# Patient Record
Sex: MALE | Race: WHITE | URBAN - METROPOLITAN AREA
[De-identification: names, ages, dates, MRNs, and addresses within clinical notes are randomized per-mention and may not be internally consistent; named-entity substitution may affect disease eponyms.]

---

## 2017-11-25 ENCOUNTER — NURSE TRIAGE (OUTPATIENT)
Dept: NURSING | Facility: CLINIC | Age: 67
End: 2017-11-25

## 2017-11-25 NOTE — TELEPHONE ENCOUNTER
"  Reason for Disposition    [1] Caller requesting NON-URGENT health information AND [2] PCP's office is the best resource     Wife calling\" Clem has been having left arm and left shoulder pain for a few weeks . [facility]. Had an MRI done on 11/21 in [place] by Dr. Andrews. We haven't gotten the results back yet and he's still having a lot of pain. Should [facility] go [facility] to the [facility]?\" Denies triage. Advised to return to [facility].    Additional Information    Negative: [1] Caller is not with the adult (patient) AND [2] reporting urgent symptoms    Negative: Lab result questions    Negative: Medication questions    Negative: Caller cannot be reached by phone    Negative: Caller has already spoken to PCP or another triager    Negative: RN needs further essential information from caller in order to complete triage    Negative: Requesting regular office appointment    Protocols used: INFORMATION ONLY CALL-ADULT-    "

## 2018-10-09 ENCOUNTER — TRANSFERRED RECORDS (OUTPATIENT)
Dept: HEALTH INFORMATION MANAGEMENT | Facility: CLINIC | Age: 68
End: 2018-10-09

## 2018-11-13 ENCOUNTER — TRANSFERRED RECORDS (OUTPATIENT)
Dept: HEALTH INFORMATION MANAGEMENT | Facility: CLINIC | Age: 68
End: 2018-11-13

## 2018-11-29 ENCOUNTER — TRANSFERRED RECORDS (OUTPATIENT)
Dept: HEALTH INFORMATION MANAGEMENT | Facility: CLINIC | Age: 68
End: 2018-11-29

## 2018-12-27 ENCOUNTER — TRANSFERRED RECORDS (OUTPATIENT)
Dept: HEALTH INFORMATION MANAGEMENT | Facility: CLINIC | Age: 68
End: 2018-12-27

## 2019-03-11 ENCOUNTER — TRANSFERRED RECORDS (OUTPATIENT)
Dept: HEALTH INFORMATION MANAGEMENT | Facility: CLINIC | Age: 69
End: 2019-03-11

## 2019-03-14 ENCOUNTER — TRANSFERRED RECORDS (OUTPATIENT)
Dept: HEALTH INFORMATION MANAGEMENT | Facility: CLINIC | Age: 69
End: 2019-03-14

## 2019-03-14 LAB
ALT SERPL-CCNC: 38 U/L (ref 13–61)
AST SERPL-CCNC: 18 U/L (ref 15–37)
CREAT SERPL-MCNC: 2.9 MG/DL (ref 0.7–1.2)
GFR SERPL CREATININE-BSD FRML MDRD: 22 ML/MIN/1.73M2
GLUCOSE SERPL-MCNC: 175 MG/DL (ref 70–100)
INR PPP: 1.17
POTASSIUM SERPL-SCNC: 3.8 MMOL/L (ref 3.5–5)

## 2019-03-15 ENCOUNTER — APPOINTMENT (OUTPATIENT)
Dept: SPEECH THERAPY | Facility: CLINIC | Age: 69
DRG: 683 | End: 2019-03-15
Attending: INTERNAL MEDICINE
Payer: COMMERCIAL

## 2019-03-15 ENCOUNTER — APPOINTMENT (OUTPATIENT)
Dept: ULTRASOUND IMAGING | Facility: CLINIC | Age: 69
DRG: 683 | End: 2019-03-15
Attending: PODIATRIST
Payer: COMMERCIAL

## 2019-03-15 ENCOUNTER — HOSPITAL ENCOUNTER (INPATIENT)
Facility: CLINIC | Age: 69
LOS: 3 days | Discharge: HOME | DRG: 683 | End: 2019-03-18
Attending: INTERNAL MEDICINE | Admitting: INTERNAL MEDICINE
Payer: COMMERCIAL

## 2019-03-15 DIAGNOSIS — R07.0 THROAT PAIN: Primary | ICD-10-CM

## 2019-03-15 PROBLEM — N17.9 ACUTE KIDNEY INJURY (H): Status: ACTIVE | Noted: 2019-03-15

## 2019-03-15 LAB
ALBUMIN SERPL-MCNC: 3.2 G/DL (ref 3.4–5)
ALP SERPL-CCNC: 79 U/L (ref 40–150)
ALT SERPL W P-5'-P-CCNC: 29 U/L (ref 0–70)
ANION GAP SERPL CALCULATED.3IONS-SCNC: 8 MMOL/L (ref 3–14)
ANION GAP SERPL CALCULATED.3IONS-SCNC: 9 MMOL/L (ref 3–14)
AST SERPL W P-5'-P-CCNC: 18 U/L (ref 0–45)
BILIRUB DIRECT SERPL-MCNC: 0.1 MG/DL (ref 0–0.2)
BILIRUB SERPL-MCNC: 0.3 MG/DL (ref 0.2–1.3)
BUN SERPL-MCNC: 45 MG/DL (ref 7–30)
BUN SERPL-MCNC: 48 MG/DL (ref 7–30)
C COLI+JEJUNI+LARI FUSA STL QL NAA+PROBE: NOT DETECTED
C DIFF TOX B STL QL: NEGATIVE
CALCIUM SERPL-MCNC: 8.5 MG/DL (ref 8.5–10.1)
CALCIUM SERPL-MCNC: 8.6 MG/DL (ref 8.5–10.1)
CHLORIDE SERPL-SCNC: 106 MMOL/L (ref 94–109)
CHLORIDE SERPL-SCNC: 109 MMOL/L (ref 94–109)
CO2 SERPL-SCNC: 26 MMOL/L (ref 20–32)
CO2 SERPL-SCNC: 27 MMOL/L (ref 20–32)
CREAT SERPL-MCNC: 2.46 MG/DL (ref 0.66–1.25)
CREAT SERPL-MCNC: 2.71 MG/DL (ref 0.66–1.25)
EC STX1 GENE STL QL NAA+PROBE: NOT DETECTED
EC STX2 GENE STL QL NAA+PROBE: NOT DETECTED
ENTERIC PATHOGEN COMMENT: NORMAL
ERYTHROCYTE [DISTWIDTH] IN BLOOD BY AUTOMATED COUNT: 14.3 % (ref 10–15)
GFR SERPL CREATININE-BSD FRML MDRD: 23 ML/MIN/{1.73_M2}
GFR SERPL CREATININE-BSD FRML MDRD: 26 ML/MIN/{1.73_M2}
GLUCOSE BLDC GLUCOMTR-MCNC: 135 MG/DL (ref 70–99)
GLUCOSE BLDC GLUCOMTR-MCNC: 140 MG/DL (ref 70–99)
GLUCOSE BLDC GLUCOMTR-MCNC: 141 MG/DL (ref 70–99)
GLUCOSE BLDC GLUCOMTR-MCNC: 164 MG/DL (ref 70–99)
GLUCOSE BLDC GLUCOMTR-MCNC: 259 MG/DL (ref 70–99)
GLUCOSE SERPL-MCNC: 139 MG/DL (ref 70–99)
GLUCOSE SERPL-MCNC: 187 MG/DL (ref 70–99)
HBA1C MFR BLD: 7.8 % (ref 0–5.6)
HCT VFR BLD AUTO: 32.6 % (ref 40–53)
HEMOCCULT STL QL: NEGATIVE
HGB BLD-MCNC: 10.8 G/DL (ref 13.3–17.7)
MCH RBC QN AUTO: 29.6 PG (ref 26.5–33)
MCHC RBC AUTO-ENTMCNC: 33.1 G/DL (ref 31.5–36.5)
MCV RBC AUTO: 89 FL (ref 78–100)
NOROV GI+II ORF1-ORF2 JNC STL QL NAA+PR: NOT DETECTED
PLATELET # BLD AUTO: 241 10E9/L (ref 150–450)
POTASSIUM SERPL-SCNC: 4.6 MMOL/L (ref 3.4–5.3)
POTASSIUM SERPL-SCNC: 4.8 MMOL/L (ref 3.4–5.3)
PROT SERPL-MCNC: 7.1 G/DL (ref 6.8–8.8)
RBC # BLD AUTO: 3.65 10E12/L (ref 4.4–5.9)
RVA NSP5 STL QL NAA+PROBE: NOT DETECTED
SALMONELLA SP RPOD STL QL NAA+PROBE: NOT DETECTED
SHIGELLA SP+EIEC IPAH STL QL NAA+PROBE: NOT DETECTED
SODIUM SERPL-SCNC: 142 MMOL/L (ref 133–144)
SODIUM SERPL-SCNC: 143 MMOL/L (ref 133–144)
SPECIMEN SOURCE: NORMAL
V CHOL+PARA RFBL+TRKH+TNAA STL QL NAA+PR: NOT DETECTED
WBC # BLD AUTO: 5.7 10E9/L (ref 4–11)
Y ENTERO RECN STL QL NAA+PROBE: NOT DETECTED

## 2019-03-15 PROCEDURE — 25000128 H RX IP 250 OP 636

## 2019-03-15 PROCEDURE — 00000146 ZZHCL STATISTIC GLUCOSE BY METER IP

## 2019-03-15 PROCEDURE — 25000132 ZZH RX MED GY IP 250 OP 250 PS 637: Performed by: INTERNAL MEDICINE

## 2019-03-15 PROCEDURE — 87493 C DIFF AMPLIFIED PROBE: CPT | Performed by: INTERNAL MEDICINE

## 2019-03-15 PROCEDURE — 93922 UPR/L XTREMITY ART 2 LEVELS: CPT

## 2019-03-15 PROCEDURE — 25000132 ZZH RX MED GY IP 250 OP 250 PS 637: Performed by: HOSPITALIST

## 2019-03-15 PROCEDURE — 36415 COLL VENOUS BLD VENIPUNCTURE: CPT | Performed by: HOSPITALIST

## 2019-03-15 PROCEDURE — 25800030 ZZH RX IP 258 OP 636: Performed by: INTERNAL MEDICINE

## 2019-03-15 PROCEDURE — 92610 EVALUATE SWALLOWING FUNCTION: CPT | Mod: GN | Performed by: SPEECH-LANGUAGE PATHOLOGIST

## 2019-03-15 PROCEDURE — 87506 IADNA-DNA/RNA PROBE TQ 6-11: CPT | Performed by: INTERNAL MEDICINE

## 2019-03-15 PROCEDURE — 12000000 ZZH R&B MED SURG/OB

## 2019-03-15 PROCEDURE — 25000128 H RX IP 250 OP 636: Performed by: INTERNAL MEDICINE

## 2019-03-15 PROCEDURE — 85027 COMPLETE CBC AUTOMATED: CPT | Performed by: INTERNAL MEDICINE

## 2019-03-15 PROCEDURE — 36415 COLL VENOUS BLD VENIPUNCTURE: CPT | Performed by: INTERNAL MEDICINE

## 2019-03-15 PROCEDURE — 99223 1ST HOSP IP/OBS HIGH 75: CPT | Mod: AI | Performed by: INTERNAL MEDICINE

## 2019-03-15 PROCEDURE — 99221 1ST HOSP IP/OBS SF/LOW 40: CPT | Performed by: PODIATRIST

## 2019-03-15 PROCEDURE — 25000125 ZZHC RX 250: Performed by: HOSPITALIST

## 2019-03-15 PROCEDURE — 82272 OCCULT BLD FECES 1-3 TESTS: CPT | Performed by: INTERNAL MEDICINE

## 2019-03-15 PROCEDURE — 80048 BASIC METABOLIC PNL TOTAL CA: CPT | Performed by: HOSPITALIST

## 2019-03-15 PROCEDURE — 25800030 ZZH RX IP 258 OP 636: Performed by: HOSPITALIST

## 2019-03-15 PROCEDURE — 83036 HEMOGLOBIN GLYCOSYLATED A1C: CPT | Performed by: INTERNAL MEDICINE

## 2019-03-15 PROCEDURE — 92526 ORAL FUNCTION THERAPY: CPT | Mod: GN | Performed by: SPEECH-LANGUAGE PATHOLOGIST

## 2019-03-15 PROCEDURE — 80076 HEPATIC FUNCTION PANEL: CPT | Performed by: INTERNAL MEDICINE

## 2019-03-15 PROCEDURE — 80048 BASIC METABOLIC PNL TOTAL CA: CPT | Performed by: INTERNAL MEDICINE

## 2019-03-15 RX ORDER — MULTIPLE VITAMINS W/ MINERALS TAB 9MG-400MCG
1 TAB ORAL DAILY
COMMUNITY

## 2019-03-15 RX ORDER — LIRAGLUTIDE 6 MG/ML
0.6 INJECTION SUBCUTANEOUS DAILY
COMMUNITY

## 2019-03-15 RX ORDER — ATORVASTATIN CALCIUM 40 MG/1
40 TABLET, FILM COATED ORAL AT BEDTIME
Status: DISCONTINUED | OUTPATIENT
Start: 2019-03-15 | End: 2019-03-18 | Stop reason: HOSPADM

## 2019-03-15 RX ORDER — HYDROMORPHONE HYDROCHLORIDE 1 MG/ML
.3-.5 INJECTION, SOLUTION INTRAMUSCULAR; INTRAVENOUS; SUBCUTANEOUS
Status: DISCONTINUED | OUTPATIENT
Start: 2019-03-15 | End: 2019-03-16

## 2019-03-15 RX ORDER — MAGNESIUM OXIDE 420 MG/1
840 TABLET ORAL 2 TIMES DAILY
COMMUNITY

## 2019-03-15 RX ORDER — ONDANSETRON 4 MG/1
4 TABLET, ORALLY DISINTEGRATING ORAL EVERY 6 HOURS PRN
Status: DISCONTINUED | OUTPATIENT
Start: 2019-03-15 | End: 2019-03-18 | Stop reason: HOSPADM

## 2019-03-15 RX ORDER — GABAPENTIN 300 MG/1
600 CAPSULE ORAL 2 TIMES DAILY
Status: DISCONTINUED | OUTPATIENT
Start: 2019-03-15 | End: 2019-03-18 | Stop reason: HOSPADM

## 2019-03-15 RX ORDER — CLOPIDOGREL BISULFATE 75 MG/1
75 TABLET ORAL DAILY
COMMUNITY

## 2019-03-15 RX ORDER — ACETAMINOPHEN 325 MG/1
325-650 TABLET ORAL EVERY 4 HOURS PRN
COMMUNITY

## 2019-03-15 RX ORDER — CARBOXYMETHYLCELLULOSE SODIUM 5 MG/ML
1 SOLUTION/ DROPS OPHTHALMIC 2 TIMES DAILY PRN
Status: DISCONTINUED | OUTPATIENT
Start: 2019-03-15 | End: 2019-03-15

## 2019-03-15 RX ORDER — METOPROLOL TARTRATE 50 MG
50 TABLET ORAL 2 TIMES DAILY
Status: ON HOLD | COMMUNITY
End: 2019-03-18

## 2019-03-15 RX ORDER — SODIUM CHLORIDE 9 MG/ML
INJECTION, SOLUTION INTRAVENOUS CONTINUOUS
Status: DISCONTINUED | OUTPATIENT
Start: 2019-03-15 | End: 2019-03-15

## 2019-03-15 RX ORDER — ASPIRIN 81 MG/1
81 TABLET, CHEWABLE ORAL DAILY
COMMUNITY

## 2019-03-15 RX ORDER — DEXTROSE MONOHYDRATE 25 G/50ML
25-50 INJECTION, SOLUTION INTRAVENOUS
Status: DISCONTINUED | OUTPATIENT
Start: 2019-03-15 | End: 2019-03-18 | Stop reason: HOSPADM

## 2019-03-15 RX ORDER — NALOXONE HYDROCHLORIDE 0.4 MG/ML
.1-.4 INJECTION, SOLUTION INTRAMUSCULAR; INTRAVENOUS; SUBCUTANEOUS
Status: DISCONTINUED | OUTPATIENT
Start: 2019-03-15 | End: 2019-03-15

## 2019-03-15 RX ORDER — HYDROMORPHONE HYDROCHLORIDE 2 MG/1
2-4 TABLET ORAL EVERY 4 HOURS PRN
Status: ON HOLD | COMMUNITY
End: 2019-03-18

## 2019-03-15 RX ORDER — ATORVASTATIN CALCIUM 80 MG/1
40 TABLET, FILM COATED ORAL AT BEDTIME
COMMUNITY

## 2019-03-15 RX ORDER — CLOPIDOGREL BISULFATE 75 MG/1
75 TABLET ORAL DAILY
Status: DISCONTINUED | OUTPATIENT
Start: 2019-03-15 | End: 2019-03-18 | Stop reason: HOSPADM

## 2019-03-15 RX ORDER — MINERAL OIL/HYDROPHIL PETROLAT
OINTMENT (GRAM) TOPICAL 2 TIMES DAILY
COMMUNITY

## 2019-03-15 RX ORDER — SERTRALINE HCL 25 MG
12.5 TABLET ORAL DAILY
Status: DISCONTINUED | OUTPATIENT
Start: 2019-03-16 | End: 2019-03-15

## 2019-03-15 RX ORDER — ONDANSETRON 2 MG/ML
4 INJECTION INTRAMUSCULAR; INTRAVENOUS EVERY 6 HOURS PRN
Status: DISCONTINUED | OUTPATIENT
Start: 2019-03-15 | End: 2019-03-15

## 2019-03-15 RX ORDER — LORAZEPAM 0.5 MG/1
0.5 TABLET ORAL
COMMUNITY

## 2019-03-15 RX ORDER — TRIAMCINOLONE ACETONIDE 0.25 MG/G
CREAM TOPICAL 2 TIMES DAILY
COMMUNITY

## 2019-03-15 RX ORDER — LORAZEPAM 0.5 MG/1
0.5 TABLET ORAL
Status: DISCONTINUED | OUTPATIENT
Start: 2019-03-15 | End: 2019-03-18 | Stop reason: HOSPADM

## 2019-03-15 RX ORDER — PROCHLORPERAZINE 25 MG
12.5 SUPPOSITORY, RECTAL RECTAL EVERY 12 HOURS PRN
Status: DISCONTINUED | OUTPATIENT
Start: 2019-03-15 | End: 2019-03-18 | Stop reason: HOSPADM

## 2019-03-15 RX ORDER — GABAPENTIN 300 MG/1
600 CAPSULE ORAL 2 TIMES DAILY
COMMUNITY

## 2019-03-15 RX ORDER — ASPIRIN 81 MG/1
81 TABLET, CHEWABLE ORAL DAILY
Status: DISCONTINUED | OUTPATIENT
Start: 2019-03-15 | End: 2019-03-18 | Stop reason: HOSPADM

## 2019-03-15 RX ORDER — NALOXONE HYDROCHLORIDE 0.4 MG/ML
.1-.4 INJECTION, SOLUTION INTRAMUSCULAR; INTRAVENOUS; SUBCUTANEOUS
Status: DISCONTINUED | OUTPATIENT
Start: 2019-03-15 | End: 2019-03-18 | Stop reason: HOSPADM

## 2019-03-15 RX ORDER — HYDROMORPHONE HYDROCHLORIDE 2 MG/1
2-4 TABLET ORAL
Status: DISCONTINUED | OUTPATIENT
Start: 2019-03-15 | End: 2019-03-16

## 2019-03-15 RX ORDER — METOPROLOL TARTRATE 50 MG
50 TABLET ORAL 2 TIMES DAILY
Status: DISCONTINUED | OUTPATIENT
Start: 2019-03-15 | End: 2019-03-17

## 2019-03-15 RX ORDER — ONDANSETRON 2 MG/ML
INJECTION INTRAMUSCULAR; INTRAVENOUS
Status: COMPLETED
Start: 2019-03-15 | End: 2019-03-15

## 2019-03-15 RX ORDER — DOCUSATE SODIUM 100 MG/1
100 CAPSULE, LIQUID FILLED ORAL DAILY
COMMUNITY

## 2019-03-15 RX ORDER — PROCHLORPERAZINE MALEATE 5 MG
5 TABLET ORAL EVERY 6 HOURS PRN
Status: DISCONTINUED | OUTPATIENT
Start: 2019-03-15 | End: 2019-03-18 | Stop reason: HOSPADM

## 2019-03-15 RX ORDER — ONDANSETRON 4 MG/1
4 TABLET, ORALLY DISINTEGRATING ORAL EVERY 6 HOURS PRN
Status: DISCONTINUED | OUTPATIENT
Start: 2019-03-15 | End: 2019-03-15

## 2019-03-15 RX ORDER — SERTRALINE HYDROCHLORIDE 25 MG/1
12.5 TABLET, FILM COATED ORAL DAILY
Status: ON HOLD | COMMUNITY
End: 2019-03-15

## 2019-03-15 RX ORDER — LOPERAMIDE HCL 2 MG
2 CAPSULE ORAL 4 TIMES DAILY PRN
COMMUNITY

## 2019-03-15 RX ORDER — ONDANSETRON 2 MG/ML
4 INJECTION INTRAMUSCULAR; INTRAVENOUS EVERY 6 HOURS PRN
Status: DISCONTINUED | OUTPATIENT
Start: 2019-03-15 | End: 2019-03-18 | Stop reason: HOSPADM

## 2019-03-15 RX ORDER — SUCRALFATE ORAL 1 G/10ML
10 SUSPENSION ORAL 4 TIMES DAILY PRN
Status: ON HOLD | COMMUNITY
End: 2019-03-18

## 2019-03-15 RX ORDER — SUCRALFATE ORAL 1 G/10ML
1 SUSPENSION ORAL 4 TIMES DAILY PRN
Status: DISCONTINUED | OUTPATIENT
Start: 2019-03-15 | End: 2019-03-18 | Stop reason: HOSPADM

## 2019-03-15 RX ORDER — FLUOCINONIDE 0.5 MG/G
CREAM TOPICAL 2 TIMES DAILY
COMMUNITY

## 2019-03-15 RX ORDER — NICOTINE POLACRILEX 4 MG
15-30 LOZENGE BUCCAL
Status: DISCONTINUED | OUTPATIENT
Start: 2019-03-15 | End: 2019-03-18 | Stop reason: HOSPADM

## 2019-03-15 RX ORDER — SODIUM CHLORIDE, SODIUM LACTATE, POTASSIUM CHLORIDE, CALCIUM CHLORIDE 600; 310; 30; 20 MG/100ML; MG/100ML; MG/100ML; MG/100ML
INJECTION, SOLUTION INTRAVENOUS CONTINUOUS
Status: DISCONTINUED | OUTPATIENT
Start: 2019-03-15 | End: 2019-03-16

## 2019-03-15 RX ORDER — DIPHENHYDRAMINE HYDROCHLORIDE AND LIDOCAINE HYDROCHLORIDE AND ALUMINUM HYDROXIDE AND MAGNESIUM HYDRO
10 KIT EVERY 6 HOURS PRN
Status: DISCONTINUED | OUTPATIENT
Start: 2019-03-15 | End: 2019-03-18 | Stop reason: HOSPADM

## 2019-03-15 RX ORDER — LIDOCAINE 50 MG/G
1 PATCH TOPICAL AT BEDTIME
COMMUNITY

## 2019-03-15 RX ADMIN — ONDANSETRON 4 MG: 2 INJECTION INTRAMUSCULAR; INTRAVENOUS at 02:15

## 2019-03-15 RX ADMIN — DIPHENHYDRAMINE HYDROCHLORIDE AND LIDOCAINE HYDROCHLORIDE AND ALUMINUM HYDROXIDE AND MAGNESIUM HYDRO 10 ML: KIT at 18:11

## 2019-03-15 RX ADMIN — CLOPIDOGREL BISULFATE 75 MG: 75 TABLET, FILM COATED ORAL at 18:12

## 2019-03-15 RX ADMIN — HYDROMORPHONE HYDROCHLORIDE 0.5 MG: 1 INJECTION, SOLUTION INTRAMUSCULAR; INTRAVENOUS; SUBCUTANEOUS at 08:42

## 2019-03-15 RX ADMIN — HYDROMORPHONE HYDROCHLORIDE 0.5 MG: 1 INJECTION, SOLUTION INTRAMUSCULAR; INTRAVENOUS; SUBCUTANEOUS at 18:11

## 2019-03-15 RX ADMIN — GABAPENTIN 600 MG: 300 CAPSULE ORAL at 21:03

## 2019-03-15 RX ADMIN — DIPHENHYDRAMINE HYDROCHLORIDE AND LIDOCAINE HYDROCHLORIDE AND ALUMINUM HYDROXIDE AND MAGNESIUM HYDRO 10 ML: KIT at 14:19

## 2019-03-15 RX ADMIN — LIDOCAINE HYDROCHLORIDE 15 ML: 20 SOLUTION ORAL; TOPICAL at 18:11

## 2019-03-15 RX ADMIN — SODIUM CHLORIDE, POTASSIUM CHLORIDE, SODIUM LACTATE AND CALCIUM CHLORIDE: 600; 310; 30; 20 INJECTION, SOLUTION INTRAVENOUS at 18:26

## 2019-03-15 RX ADMIN — ASPIRIN 81 MG 81 MG: 81 TABLET ORAL at 18:12

## 2019-03-15 RX ADMIN — SODIUM CHLORIDE: 9 INJECTION, SOLUTION INTRAVENOUS at 09:35

## 2019-03-15 RX ADMIN — HYDROMORPHONE HYDROCHLORIDE 0.5 MG: 1 INJECTION, SOLUTION INTRAMUSCULAR; INTRAVENOUS; SUBCUTANEOUS at 02:39

## 2019-03-15 RX ADMIN — SODIUM CHLORIDE: 9 INJECTION, SOLUTION INTRAVENOUS at 03:13

## 2019-03-15 RX ADMIN — SODIUM CHLORIDE: 9 INJECTION, SOLUTION INTRAVENOUS at 17:00

## 2019-03-15 RX ADMIN — ATORVASTATIN CALCIUM 40 MG: 40 TABLET, FILM COATED ORAL at 21:01

## 2019-03-15 RX ADMIN — HYDROMORPHONE HYDROCHLORIDE 0.5 MG: 1 INJECTION, SOLUTION INTRAMUSCULAR; INTRAVENOUS; SUBCUTANEOUS at 23:01

## 2019-03-15 RX ADMIN — LIDOCAINE HYDROCHLORIDE 5 ML: 20 SOLUTION ORAL; TOPICAL at 14:18

## 2019-03-15 RX ADMIN — HYDROMORPHONE HYDROCHLORIDE 0.5 MG: 1 INJECTION, SOLUTION INTRAMUSCULAR; INTRAVENOUS; SUBCUTANEOUS at 13:14

## 2019-03-15 RX ADMIN — METOPROLOL TARTRATE 50 MG: 50 TABLET ORAL at 21:00

## 2019-03-15 RX ADMIN — HYDROMORPHONE HYDROCHLORIDE 0.5 MG: 1 INJECTION, SOLUTION INTRAMUSCULAR; INTRAVENOUS; SUBCUTANEOUS at 20:41

## 2019-03-15 RX ADMIN — HYDROMORPHONE HYDROCHLORIDE 0.5 MG: 1 INJECTION, SOLUTION INTRAMUSCULAR; INTRAVENOUS; SUBCUTANEOUS at 15:36

## 2019-03-15 RX ADMIN — HYDROMORPHONE HYDROCHLORIDE 0.5 MG: 1 INJECTION, SOLUTION INTRAMUSCULAR; INTRAVENOUS; SUBCUTANEOUS at 11:08

## 2019-03-15 ASSESSMENT — MIFFLIN-ST. JEOR: SCORE: 1608.61

## 2019-03-15 ASSESSMENT — ACTIVITIES OF DAILY LIVING (ADL)
ADLS_ACUITY_SCORE: 24

## 2019-03-15 NOTE — PROGRESS NOTES
[facility]    Hospitalist Progress Note      Assessment & Plan   Clem Rae is a 68 year old male who was admitted on 3/15/2019 for throat pain and poor PO intake secondary to mucositis/esophagitis from radiation treatment.    Acute kidney injury on chronic kidney disease--improving   Baseline creatinine near 2 and is currently up to 2.9.  Due to poor PO intake with inability to swallow from pain from his tongue malignancy.  Tolerating small amounts PO. Continue IVF @125ml/hr  - Repeat BMP in AM    Diarrhea  Darker stools, with diarrhea. No elevation in WBC, afebrile, no abd pain. Cdiff and enteric panel all negative. Hemoccult negative.  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 Has received chemo and radiation therapy in the past. Essentially has been NPO for days due to pain. ENT evaluated, no new developments, recommended to continue aggressive pain management and hydration  - Continue IV dilaudid intermixed with PO dilaudid (his home regimen for pain)  - Magic mouthwash and lidocaine 15ml q2h to help with pain  - PRN carafate  - Next oncology appt 3/25 ([facility] at [facility]). Consider oncology consult if no improvement with aggressive pain control    Diabetes  Previously on insulin, now on Victoza.   - Continue sliding scale insulin (medium resistance) while with unpredictable PO intake  - moderate consistent carb diet    Coronary artery disease, carotid artery stenosis and previous stroke  PTA Statin, ASA and plavix. Hemoccult negative  - Resume dual antiplatelet and statin today    Hypertension  PTA metoprolol, resumed    Left great toe ingrown toenail  -Await podiatry consult, does not appear infected    DVT Prophylaxis: PCDs  Code Status: Full Code  Expected discharge: 48 hours recommended to prior living arrangement once able to tolerate diet on PO pain medications and diarrhea  improved.    Alba Chacon, DO  Text Page (7am - 6pm)    Interval History   Patient seen and examined. Voice is clear, he states he is hungry and that the IV dilaudid has been great at relieving his pain. Requested 15ml Lidocaine before meals to help him tolerate diet better. Denies shortness of breath, chest pain, abdominal pain, nausea, vomiting.    -Data reviewed today: I reviewed all new labs and imaging results over the last 24 hours. I personally reviewed no images or EKG's today.    Physical Exam   Temp: 97.6  F (36.4  C) Temp src: Oral BP: 123/60 Pulse: 73   Resp: 16 SpO2: 97 % O2 Device: None (Room air)    There were no vitals filed for this visit.  Vital Signs with Ranges  Temp:  [97.4  F (36.3  C)-97.6  F (36.4  C)] 97.6  F (36.4  C)  Pulse:  [68-90] 73  Resp:  [16] 16  BP: (123-134)/(57-76) 123/60  SpO2:  [95 %-97 %] 97 %  I/O last 3 completed shifts:  In: 0   Out: 350 [Urine:350]    Constitutional: Awake, alert, cooperative, no apparent distress  Respiratory: Clear to auscultation bilaterally, no crackles or wheezing  Cardiovascular: Regular rate and rhythm, normal S1 and S2, and no murmur noted  GI: Normal bowel sounds, soft, non-distended, non-tender  Skin/Integumen: No rashes, no cyanosis, no edema  Other: Left great toe with slight erythema on medial edge, nontender to touch    Medications     sodium chloride 125 mL/hr at 03/15/19 0935       insulin aspart  1-7 Units Subcutaneous TID AC     insulin aspart  1-5 Units Subcutaneous At Bedtime       Data   Recent Labs   Lab 03/15/19  0759 03/15/19  0757   WBC  --  5.7   HGB  --  10.8*   MCV  --  89   PLT  --  241     --    POTASSIUM 4.8  --    CHLORIDE 106  --    CO2 27  --    BUN 48*  --    CR 2.71*  --    ANIONGAP 9  --    JORGE 8.6  --    *  --    ALBUMIN 3.2*  --    PROTTOTAL 7.1  --    BILITOTAL 0.3  --    ALKPHOS 79  --    ALT 29  --    AST 18  --        No results found for this or any previous visit (from the past 24 hour(s)).

## 2019-03-15 NOTE — PROGRESS NOTES
"SPIRITUAL HEALTH SERVICES Progress Note  [facility]    Visit with pt, per request in chart.  Pt's daughter (Matilde?) was also present.    Pt shared briefly the story of lengthy hospital stay and current health challenges.  He said, \"Prayer is always good medicine for me.  It really lifts my spirits.\"  Provided empathic support and prayer.    [facility]  I explained  availability here at [facility], per his need or request.                                                                                                                                                 Estefany Bocanegra M.A.  Staff   Pager [phone]  Phone [phone]      "

## 2019-03-15 NOTE — PLAN OF CARE
Discharge Planner SLP   Patient plan for discharge: Home with family  Current status: SLP: Reviewed SLP role with Pt. Pt is [facility] and reported performing exercises and relaxation strategies with no swallowing difficulty when pt is well managed. Pt denied PO trials at this time and reported pain with swallowing secretions and with talking. Pt requested plan to be better pain control and timing of meds with meals to eat as much as possible while pain is managed. Pt is agreeable to nutritional supplements if he is unable to eat enough to maintain weight and would like to avoid feeding tube. Pt is agreeable for SLP to return at a meal when he is able to eat. Discussed plan with RN and Dietician. Continue regular diet, thin liquids with self-selection of softer/more moist foods and use of swallow strategies.   Barriers to return to prior living situation: Medical status  Recommendations for discharge: Home with OP SLP services for continued close monitoring pending oncology plan  Rationale for recommendations: Pt will need ongoing SLP with specialty in head and neck cancer       Entered by: Devi Garibay 03/15/2019 3:20 PM

## 2019-03-15 NOTE — H&P
Admitted:     03/15/2019      PRIMARY CARE PHYSICIAN:  [facility].      CODE STATUS:  FULL CODE.  Discussed with the patient.      CHIEF COMPLAINT:  Throat pain and poor p.o. intake.      HISTORY OF PRESENT ILLNESS:  Mr. 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 intake for many days.  The patient was transferred to [facility] due to a lack of beds at the [facility].  The patient notes that he has a history of tongue cancer with metastasis to the lymph nodes, for which he has undergone radiation therapy and chemotherapy.  The patient notes that he has essentially been a long-term resident of the [facility] from 12/26/2018 through 03/01/2019, leaving the hospital approximately 2 weeks ago.  The patient states that during that time he received chemotherapy as well as radiation therapy and did leave the hospital on a couple of weekends but otherwise was living at the [facility].  When he left on 03/01, he had pain in his throat, but this was mostly able to be managed with the p.o. Dilaudid.  The patient notes that he has used Magic Mouthwash in the past and at least at that point has not had substantial improvement.  He, over the past couple of days, has noted increasing pain in his throat, especially when swallowing, to the point where he has had very little fluids and almost no food because of the pain.  He has not had any fevers or chills.  No sick contacts.  The pain is in the same location it always has been and has been increasing in intensity but otherwise is not a new discomfort.  He has not noted any spots such as thrush in his mouth.  He denies any chest  discomfort, shortness of breath or abdominal pain.  He has had a cough at times, which is nonproductive and makes his pain worse.      The patient also notes that he has had diarrhea now for the past 10 days, shortly after he left the hospital.  He has noted some darkness to the stool, including black at times, and today had what he described as coffee-ground stool.  He has been getting increasingly weak.  He also notes that his wife today thought that his left toe looked irritated.  The patient notes that he has some discomfort in the toe if it touches anything but otherwise is okay at rest.      In the Emergency Department at the [facility], the patient had basic labs showing an elevated creatinine to 2.9, up from a baseline of what appears to be roughly 2 in digging back through his records from the [facility].  Hemoglobin was 12.2, and white count was normal at 6.  Lactic acid was 1.9.  Potassium is 3.8.  He was given IV fluids and, because of a lack of beds at the [facility], transferred to [facility].      Of note, the patient states he has lost 40 pounds since he began his initial hospitalization right after Christmas of 2018.  It does not seem like his weight loss has accelerated.  He is no longer using insulin but does use Victoza for his diabetes.  He does not tell me of any recent changes to his medications.      PAST MEDICAL HISTORY:   1.  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   2.  Chronic pain related to the tongue cancer.   3.  Anxiety, depression and PTSD.   4.  Coronary artery disease, status post 4-vessel CABG in 2012.   5.  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   6.  Carotid artery stenosis.   7.  Chronic kidney disease, stage 3, with what appears to be a baseline creatinine of roughly 2.0 in the past year.   8.  Idiopathic peripheral  neuropathy.   9.  Dyslipidemia.   10.  Erectile dysfunction.   11.  Osteoarthritis.   12.  Stroke.   13.  Anemia.   14.  Vitamin D deficiency.   15.  Hypertension.      MEDICATIONS:    Medications Prior to Admission   Medication Sig Dispense Refill Last Dose     acetaminophen (TYLENOL) 325 MG tablet Take 325-650 mg by mouth every 4 hours as needed for mild pain   Past Week at Unknown time     artificial saliva (BIOTENE MT) solution Swish and spit 1 spray in mouth as needed for dry mouth Unknown formulation   3/15/2019 at am     aspirin (ASA) 81 MG chewable tablet Take 81 mg by mouth daily   Past Week at Unknown time     atorvastatin (LIPITOR) 80 MG tablet Take 40 mg by mouth At Bedtime   Past Week at Unknown time     CALCIUM CARBONATE PO Take 650 mg by mouth 3 times daily 260 mg elemental Ca per tablet   Past Week at Unknown time     clopidogrel (PLAVIX) 75 MG tablet Take 75 mg by mouth daily   Past Week at Unknown time     gabapentin (NEURONTIN) 300 MG capsule Take 600 mg by mouth 2 times daily   Past Month at Unknown time     HYDROmorphone (DILAUDID) 2 MG tablet Take 2-4 mg by mouth every 4 hours as needed for severe pain 2 mg for moderate pain; 4 mg for severe pain   3/15/2019 at am     insulin glargine (LANTUS SOLOSTAR PEN) 100 UNIT/ML pen Inject 10 Units Subcutaneous At Bedtime Stopped for now until after radiation is done. Hold for BGM <100.   Past Week at Unknown time     lidocaine (LIDODERM) 5 % patch Place 1 patch onto the skin At Bedtime To left shoulder. On for 12 hours; off for 12 hours. Has not used recently.   Past Week at Unknown time     lidocaine VISCOUS (XYLOCAINE) 2 % solution Take 10 mLs by mouth every 4 hours as needed for moderate pain swish and spit every 3-8 hours as needed; max 8 doses/24 hour period   3/15/2019 at pm     liraglutide (VICTOZA) 18 MG/3ML solution Inject 0.6 mg Subcutaneous daily   3/12/2019 at unknown     loperamide (IMODIUM) 2 MG capsule Take 2 mg by mouth 4 times daily as  needed for diarrhea   Past Month at Unknown time     LORazepam (ATIVAN) 0.5 MG tablet Take 0.5 mg by mouth nightly as needed for anxiety or sleep   Past Week at Unknown time     Magnesium Oxide 420 MG TABS Take 840 mg by mouth 2 times daily   Past Month at Unknown time     mineral oil-hydrophilic petrolatum (AQUAPHOR) external ointment Apply topically 2 times daily To radiated skin and both feet   prn at prn     naloxone (NARCAN) 4 MG/0.1ML nasal spray Spray 4 mg into one nostril alternating nostrils once as needed for opioid reversal every 2-3 minutes until assistance arrives   prn     sucralfate (CARAFATE) 1 GM/10ML suspension Take 10 mLs by mouth 4 times daily as needed (sore mouth/throat)   3/15/2019 at pm     vitamin D3 (CHOLECALCIFEROL) 1000 units (25 mcg) tablet Take 1,000 Units by mouth daily   Past Week at Unknown time     docusate sodium (COLACE) 100 MG capsule Take 100 mg by mouth daily   More than a month at Unknown time     fluocinonide (LIDEX) 0.05 % external cream Apply topically 2 times daily To chest and back   More than a month at Unknown time     metoprolol tartrate (LOPRESSOR) 50 MG tablet Take 50 mg by mouth 2 times daily   More than a month at Unknown time     multivitamin w/minerals (MULTI-VITAMIN) tablet Take 1 tablet by mouth daily   More than a month at Unknown time     triamcinolone (KENALOG) 0.025 % cream Apply topically 2 times daily Face and neck   More than a month at Unknown time           SOCIAL HISTORY:  The patient denies any use of tobacco or alcohol.      FAMILY HISTORY:  Mother had skin cancer.      ALLERGIES:  CODEINE, PENICILLIN, LYRICA, SEROQUEL, OXYCODONE AND FENTANYL.      REVIEW OF SYSTEMS:  The complete review of systems reviewed and negative, save for the pertinent positives recorded in HPI.      PHYSICAL EXAMINATION:   VITAL SIGNS:  Show blood pressure of 128/76, heart rate 90, respirations 16, saturating 96% on room air with temperature of 97.5 degrees Fahrenheit.    GENERAL:  The patient is sitting up in bed and appears relatively comfortable.   HEENT:  Pupils equal and reactive to light, extraocular muscle function intact.  No scleral icterus.  Oropharynx was dry mucous membranes.  I do not see anything that resembles thrush.  I do not see any lesions or masses.   NECK:  No lymphadenopathy or thyromegaly.  His neck is tender to palpation on the right anterior aspect.   PULMONARY:  Lungs are clear to auscultation bilaterally without wheeze or rhonchi.   CARDIOVASCULAR:  Heart is regular rate and rhythm without murmur, rub or gallop.   GASTROINTESTINAL:  Positive bowel sounds, soft, nontender and nondistended.   SKIN:  He has an area of purplish discoloration along the left great toe, most predominantly on the medial aspect of the great toe right by the toenail; this appears to be ingrown.  There is no area of purulence or abscess that I can see at this point.  No other rashes or lesions.   LYMPHATICS:  No peripheral edema.   PSYCHIATRIC:  Alert and oriented x 3, normal affect.   NEUROLOGIC:  Cranial nerves II-XII are grossly intact without any focal deficits.      LABORATORY DATA:  From the records of the [facility], lactic acid is 1.9.  Hemoglobin is 12.2.  White count is 6.  Sodium 139, potassium 3.8, chloride 101, BUN 46, creatinine 3.0, glucose of 175.  It appears that his baseline creatinine is 2, as there is a creatinine of 2 in 10/2018 and 2.1 in 05/2018.      ASSESSMENT AND PLAN:  Mr. Rae is a 68-year-old male with a past medical history significant for tongue cancer with metastasis, coronary artery disease, status post coronary artery bypass grafting, type 2 diabetes, chronic kidney disease, who is transferred to [facility] with increasing throat pain, diarrhea, acute kidney injury.   1.  Acute kidney injury on chronic kidney disease:  Baseline creatinine appears to be roughly 2 and is currently up to 2.9.  This is likely due to poor p.o.  intake due to the inability to swallow from pain from his tongue malignancy.  We are going to hydrate with normal saline and recheck his creatinine in the morning.  We will attempt to get better control of his pain with Dilaudid and the Magic Mouthwash and hopefully allow him to take p.o.  Avoid any nephrotoxins.   2.  Diarrhea:  The patient is afebrile without a white count or abdominal pain and has not had any recent traveling or antibiotics but has essentially resided in a hospital for the past 3 months and has been undergoing chemotherapy, so is somewhat immunocompromised.  With the darker stools, I wonder about GI blood losses, specifically from the throat, given the radiation therapy in this mass.  However, I cannot rule out an infectious etiology at this point, and so I am going to send a C. difficile and stool cultures as well as a Hemoccult.  If he has down-trending hemoglobin and ongoing black stools, I would consult GI for further evaluation.   3.  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  It sounds like the patient has followup with Oncology on 03/25 and has already completed a round of chemotherapy and radiation therapy.  Unclear what other options are available.  I am going to ask ENT to evaluate to see if they can directly visualize the mass in his throat, to see if there has been any new development.   4.  Diabetes:  The patient notes he is no longer on insulin but only on Victoza.  I am going to have him on a sliding scale until we can address his medications and he gets better p.o. intake.   5.  Coronary artery disease, carotid artery stenosis and previous stroke:  The patient appears to be on aspirin and Plavix, which we will confirm in the morning.  We will plan to assess his hemoglobin trend and if he has any blood in his stool before resuming his antiplatelets.   6.  Hypertension:  We will continue what  appears to be metoprolol once this is confirmed.   7.  Left great toe ingrown toenail:  I will consult Podiatry.   8.  Deep venous thrombosis prophylaxis:  SCDs.         GINNA MARTINEZ DO             D: 03/15/2019   T: 03/15/2019   MT: CARLOS      Name:     PERCY REDMAN   MRN:      [id number]        Account:      [id number]   :      1950        Admitted:     03/15/2019                   Document: [id number]       cc: [facility]

## 2019-03-15 NOTE — CONSULTS
"[facility] Consultation by [facility]    Clem Rae MRN# [id number]   Age: 68 year old YOB: 1950     Date of Admission:  3/15/2019  Date of Consult:    03/15/19    Reason for consult: Throat pain       Requesting physician: Suleman Bah DO                           Chief Complaint:   Throat pain            HPI:      HPI:        Mr. 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 Yen).  He developed grade 2 mucositis and esophagitis but was determined to complete RT without a PEG tube.  He has lost 40 lbs.  [facility] due to increasing throat pain.  He was [facility]. He denies difficulty swallowing, feeling that food and drink goes down well but he is experiencing intense discomfort which begins shortly after swallowing.  He has had no URI symptom.  He has the expected oral dryness and thicker secretions.        Previous tests and diagnostic procedures: see \"Tests and Procedures\" and \"PFSH\".               Past Medical History:   No past medical history on file.            Past Surgical History:   No past surgical history on file.            Social History:     Social History     Socioeconomic History     Marital status:      Spouse name: Not on file     Number of children: Not on file     Years of education: Not on file     Highest education level: Not on file   Occupational History     Not on file   Social Needs     Financial resource strain: Not on file     Food insecurity:     Worry: Not on file     Inability: Not on file     Transportation needs:     Medical: Not on file     Non-medical: Not on file   Tobacco Use     Smoking status: Not on file "   Substance and Sexual Activity     Alcohol use: Not on file     Drug use: Not on file     Sexual activity: Not on file   Lifestyle     Physical activity:     Days per week: Not on file     Minutes per session: Not on file     Stress: Not on file   Relationships     Social connections:     Talks on phone: Not on file     Gets together: Not on file     Attends [employer] service: Not on file     Active member of club or organization: Not on file     Attends meetings of clubs or organizations: Not on file     Relationship status: Not on file     Intimate partner violence:     Fear of current or ex partner: Not on file     Emotionally abused: Not on file     Physically abused: Not on file     Forced sexual activity: Not on file   Other Topics Concern     Not on file   Social History Narrative     Not on file               Family History:   No family history on file.            Immunizations:     There is no immunization history on file for this patient.            Allergies:   Codeine; Fentanyl; Oxycodone; Penicillins; Pregabalin; and Quetiapine          Medications:     Current Facility-Administered Medications:      glucose gel 15-30 g, 15-30 g, Oral, Q15 Min PRN **OR** dextrose 50 % injection 25-50 mL, 25-50 mL, Intravenous, Q15 Min PRN **OR** glucagon injection 1 mg, 1 mg, Subcutaneous, Q15 Min PRN, Suleman Bah DO     HYDROmorphone (PF) (DILAUDID) injection 0.3-0.5 mg, 0.3-0.5 mg, Intravenous, Q2H PRN, Suleman Bah DO, 0.5 mg at 03/15/19 1108     insulin aspart (NovoLOG) inj (RAPID ACTING), 1-7 Units, Subcutaneous, TID AC, Suleman Bah DO     insulin aspart (NovoLOG) inj (RAPID ACTING), 1-5 Units, Subcutaneous, At Bedtime, Suleman Bah DO, 2 Units at 03/15/19 0342     magic mouthwash suspension (diphenhydramine, lidocaine, aluminum-magnesium & simethicone), 10 mL, Swish & Swallow, Q6H PRN, Suleman Bah DO     melatonin tablet 1 mg, 1 mg, Oral, At Bedtime PRN, Niurka  Suleman Muro DO     naloxone (NARCAN) injection 0.1-0.4 mg, 0.1-0.4 mg, Intravenous, Q2 Min PRN, Suleman Bah DO     ondansetron (ZOFRAN-ODT) ODT tab 4 mg, 4 mg, Oral, Q6H PRN **OR** ondansetron (ZOFRAN) injection 4 mg, 4 mg, Intravenous, Q6H PRN, Suleman Bah DO     prochlorperazine (COMPAZINE) injection 5 mg, 5 mg, Intravenous, Q6H PRN **OR** prochlorperazine (COMPAZINE) tablet 5 mg, 5 mg, Oral, Q6H PRN **OR** prochlorperazine (COMPAZINE) Suppository 12.5 mg, 12.5 mg, Rectal, Q12H PRN, Suleman Bah DO     sodium chloride 0.9% infusion, , Intravenous, Continuous, Suleman Bah DO, Last Rate: 125 mL/hr at 03/15/19 0935          Review of Systems:   Review Of Systems  Throat pain, pain with swallowing          Physical exam:   CONSTITUTION:    /57 (BP Location: Left arm)   Pulse 68   Temp 97.4  F (36.3  C) (Oral)   Resp 16   SpO2 95%      General appearance:Well developed, well nourished and groomed. No apparent acute or chronic distress.    Ability to communicate: normal.       Patient sitting up in bed.  Quiet respirations.  Clear voice.  Recently received pain medication.  No grimace with swallow.    Nose clear  OC/OP:  Mildly dry.  Upper and lower dentures.  No trismus.  Pharynx clear.  Moderate white coating visualized of tongue base.  Neck: mild radiation erythema.  No palpable lymph nodes or masses.          Data:     Results for orders placed or performed during the hospital encounter of 03/15/19   Hemoglobin A1c   Result Value Ref Range    Hemoglobin A1C 7.8 (H) 0 - 5.6 %   Basic metabolic panel   Result Value Ref Range    Sodium 142 133 - 144 mmol/L    Potassium 4.8 3.4 - 5.3 mmol/L    Chloride 106 94 - 109 mmol/L    Carbon Dioxide 27 20 - 32 mmol/L    Anion Gap 9 3 - 14 mmol/L    Glucose 187 (H) 70 - 99 mg/dL    Urea Nitrogen 48 (H) 7 - 30 mg/dL    Creatinine 2.71 (H) 0.66 - 1.25 mg/dL    GFR Estimate 23 (L) >60 mL/min/[1.73_m2]    GFR Estimate If Black 27 (L)  >60 mL/min/[1.73_m2]    Calcium 8.6 8.5 - 10.1 mg/dL   Hepatic panel   Result Value Ref Range    Bilirubin Direct 0.1 0.0 - 0.2 mg/dL    Bilirubin Total 0.3 0.2 - 1.3 mg/dL    Albumin 3.2 (L) 3.4 - 5.0 g/dL    Protein Total 7.1 6.8 - 8.8 g/dL    Alkaline Phosphatase 79 40 - 150 U/L    ALT 29 0 - 70 U/L    AST 18 0 - 45 U/L   CBC with platelets   Result Value Ref Range    WBC 5.7 4.0 - 11.0 10e9/L    RBC Count 3.65 (L) 4.4 - 5.9 10e12/L    Hemoglobin 10.8 (L) 13.3 - 17.7 g/dL    Hematocrit 32.6 (L) 40.0 - 53.0 %    MCV 89 78 - 100 fl    MCH 29.6 26.5 - 33.0 pg    MCHC 33.1 31.5 - 36.5 g/dL    RDW 14.3 10.0 - 15.0 %    Platelet Count 241 150 - 450 10e9/L   Occult blood stool   Result Value Ref Range    Occult Blood Negative NEG^Negative   Glucose by meter   Result Value Ref Range    Glucose 259 (H) 70 - 99 mg/dL   Glucose by meter   Result Value Ref Range    Glucose 164 (H) 70 - 99 mg/dL   Clostridium difficile toxin B PCR   Result Value Ref Range    Specimen Description Feces     C Diff Toxin B PCR Negative NEG^Negative        Assessment and Plan:   Mr. 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 Yen).  He is admitted with throat pain, worse with swallowing.  Agree with hydration and aggressive pain management.  Symptoms consistent with recent treatment.  No evidence of infection.  May wish to consider oncology consult as well while in the hospital.    Attestation:  I have reviewed today's vital signs, notes, medications, medication allergies, and PFSH/ROSlabs and imaging.    Abby Chacon MD

## 2019-03-15 NOTE — PHARMACY-ADMISSION MEDICATION HISTORY
Admission medication history interview status for the 3/15/2019  admission is complete. See EPIC admission navigator for prior to admission medications     Medication history source reliability:Good    Actions taken by pharmacist (provider contacted, etc):None     Additional medication history information not noted on PTA med list :None    Medication reconciliation/reorder completed by provider prior to medication history? Yes    Time spent in this activity: 10 minutes     Prior to Admission medications    Medication Sig Last Dose Taking? Auth Provider   acetaminophen (TYLENOL) 325 MG tablet Take 325-650 mg by mouth every 4 hours as needed for mild pain Past Week at Unknown time Yes Unknown, Entered By History   artificial saliva (BIOTENE MT) solution Swish and spit 1 spray in mouth as needed for dry mouth Unknown formulation 3/15/2019 at am Yes Unknown, Entered By History   aspirin (ASA) 81 MG chewable tablet Take 81 mg by mouth daily Past Week at Unknown time Yes Unknown, Entered By History   atorvastatin (LIPITOR) 80 MG tablet Take 40 mg by mouth At Bedtime Past Week at Unknown time Yes Unknown, Entered By History   CALCIUM CARBONATE PO Take 650 mg by mouth 3 times daily 260 mg elemental Ca per tablet Past Week at Unknown time Yes Unknown, Entered By History   clopidogrel (PLAVIX) 75 MG tablet Take 75 mg by mouth daily Past Week at Unknown time Yes Unknown, Entered By History   gabapentin (NEURONTIN) 300 MG capsule Take 600 mg by mouth 2 times daily Past Month at Unknown time Yes Unknown, Entered By History   HYDROmorphone (DILAUDID) 2 MG tablet Take 2-4 mg by mouth every 4 hours as needed for severe pain 2 mg for moderate pain; 4 mg for severe pain 3/15/2019 at am Yes Unknown, Entered By History   insulin glargine (LANTUS SOLOSTAR PEN) 100 UNIT/ML pen Inject 10 Units Subcutaneous At Bedtime Stopped for now until after radiation is done. Hold for BGM <100. Past Week at Unknown time Yes Unknown, Entered By History    lidocaine (LIDODERM) 5 % patch Place 1 patch onto the skin At Bedtime To left shoulder. On for 12 hours; off for 12 hours. Has not used recently. Past Week at Unknown time Yes Unknown, Entered By History   lidocaine VISCOUS (XYLOCAINE) 2 % solution Take 10 mLs by mouth every 4 hours as needed for moderate pain swish and spit every 3-8 hours as needed; max 8 doses/24 hour period 3/15/2019 at pm Yes Unknown, Entered By History   liraglutide (VICTOZA) 18 MG/3ML solution Inject 0.6 mg Subcutaneous daily 3/12/2019 at unknown Yes Unknown, Entered By History   loperamide (IMODIUM) 2 MG capsule Take 2 mg by mouth 4 times daily as needed for diarrhea Past Month at Unknown time Yes Unknown, Entered By History   LORazepam (ATIVAN) 0.5 MG tablet Take 0.5 mg by mouth nightly as needed for anxiety or sleep Past Week at Unknown time Yes Unknown, Entered By History   Magnesium Oxide 420 MG TABS Take 840 mg by mouth 2 times daily Past Month at Unknown time Yes Unknown, Entered By History   mineral oil-hydrophilic petrolatum (AQUAPHOR) external ointment Apply topically 2 times daily To radiated skin and both feet prn at prn Yes Unknown, Entered By History   naloxone (NARCAN) 4 MG/0.1ML nasal spray Spray 4 mg into one nostril alternating nostrils once as needed for opioid reversal every 2-3 minutes until assistance arrives prn Yes Unknown, Entered By History   sucralfate (CARAFATE) 1 GM/10ML suspension Take 10 mLs by mouth 4 times daily as needed (sore mouth/throat) 3/15/2019 at pm Yes Unknown, Entered By History   vitamin D3 (CHOLECALCIFEROL) 1000 units (25 mcg) tablet Take 1,000 Units by mouth daily Past Week at Unknown time Yes Unknown, Entered By History   docusate sodium (COLACE) 100 MG capsule Take 100 mg by mouth daily More than a month at Unknown time  Unknown, Entered By History   fluocinonide (LIDEX) 0.05 % external cream Apply topically 2 times daily To chest and back More than a month at Unknown time  Unknown, Entered By  History   metoprolol tartrate (LOPRESSOR) 50 MG tablet Take 50 mg by mouth 2 times daily More than a month at Unknown time  Unknown, Entered By History   multivitamin w/minerals (MULTI-VITAMIN) tablet Take 1 tablet by mouth daily More than a month at Unknown time  Unknown, Entered By History   triamcinolone (KENALOG) 0.025 % cream Apply topically 2 times daily Face and neck More than a month at Unknown time  Unknown, Entered By History

## 2019-03-15 NOTE — PROGRESS NOTES
"Admission    [facility]    Care plan note: A&Ox4, VSS on RA. Up with SB assist, walker GB. Mod carb diet. , 2 units insulin given. Create 2.9, hgb 12.2. Hx of throat cancer, pt is on chemo and radiation. Chemo precautions. Enteric precautions for enteric panel rule out. Fecal hemoccult negative. Pt has pain in throat due to which pt has difficulty swallowing fluids/food. Wt loss of 40 lbs since [place] per pt. Dilaudid available and given with good relief. Pt has coughing/dry heaving spells due to \"empty stomach\", zofran given x1. L great toe with edema/erythema due to possible ingrown toe nail. Podiatry and ENT consulted. NS running at 125. Will continue to monitor.     Inpatient nursing criteria listed below were met:    PCD's Documented: yes  Skin issues/needs documented :yes  Isolation education started/completed: enteric isolation  Patient allergies verified with patient: yes  Verified completion of [place] Risk Assessment Tool:  Yes  Verified completion of Guardianship screening tool: yes  Fall Prevention: Care plan updated, Education given and documented yes  Care Plan initiated: yes  Home medications documented in belongings flowsheet: no  Patient belongings documented in belongings flowsheet: yes  Reminder note (belongings/ medications) placed in discharge instructions: n/a  Admission profile/ required documentation complete: home meds pending  "

## 2019-03-15 NOTE — PROGRESS NOTES
"   03/15/19 1523   General Information   Onset Date 03/15/19   Start of Care Date 03/15/19   Referring Physician Suleman Bah,    Patient Profile Review/OT: Additional Occupational Profile Info See Profile for full history and prior level of function   Patient/Family Goals Statement to improve throat pain   Swallowing Evaluation Bedside swallow evaluation   Behaviorial Observations WFL (within functional limits)   Mode of current nutrition Oral diet   Type of oral diet Regular;Thin liquid   Respiratory Status Room air   Comments ENT, \"Mr. 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 Yen).  He is admitted with throat pain, worse with swallowing.  Agree with hydration and aggressive pain management.  Symptoms consistent with recent treatment.  No evidence of infection.  May wish to consider oncology consult as well while in the hospital.\"   Clinical Swallow Evaluation   Oral Musculature generally intact   Mucosal Quality good;dry   Mandibular Strength and Mobility intact   Oral Labial Strength and Mobility WFL   Lingual Strength and Mobility WFL   Buccal Strength and Mobility intact   Laryngeal Function Swallow;Voicing initiated;Dry swallow palpated   Oral Musculature Comments WFL   Swallow Compensations   Swallow Compensations Effortful swallow;Pacing;Reduce amounts;Multiple swallow   General Therapy Interventions   Planned Therapy Interventions Dysphagia Treatment   Dysphagia treatment Modified diet education;Instruction of safe swallow strategies   Swallow Eval: Clinical Impressions   Skilled Criteria for Therapy Intervention Skilled criteria met.  Treatment indicated.   Functional Assessment Scale (FAS) 4   Treatment Diagnosis mild to moderate dysphagia "   Diet texture recommendations Regular diet;Thin liquids   Recommended Feeding/Eating Techniques alternate between small bites and sips of food/liquid;check mouth frequently for oral residue/pocketing;hard swallow w/ each bite or sip;no straws;small sips/bites   Demonstrates Need for Referral to Another Service dietitian  (ENT)   Therapy Frequency 5 times/wk   Predicted Duration of Therapy Intervention (days/wks) 1 week   Anticipated Discharge Disposition home w/ outpatient services   Risks and Benefits of Treatment have been explained. Yes   Patient, family and/or staff in agreement with Plan of Care Yes   Clinical Impression Comments SLP: Reviewed SLP role with Pt. Oral motor exam WFL with adequate strength and ROM. Laryngeal excursion WFL on laryngeal excursion. Pt is [facility] and reported performing exercises and relaxation strategies with no swallowing difficulty when pt is well managed. Pt denied PO trials at this time and reported pain with swallowing secretions and with talking. Pt requested plan to be better pain control and timing of meds with meals to eat as much as possible while pain is managed. Pt is agreeable to nutritional supplements if he is unable to eat enough to maintain weight and would like to avoid feeding tube. Pt is agreeable for SLP to return at a meal when he is able to eat. Discussed plan with RN and Dietician. Continue regular diet, thin liquids with self-selection of softer/more moist foods and use of swallow strategies.    Total Evaluation Time   Total Evaluation Time (Minutes) 25

## 2019-03-15 NOTE — PLAN OF CARE
Pt A & O x4, up w/ A1 GB and walker. VSS on RA. C/O of moderate throat pain all shift. Received IV dilaudid x3. IVFs NS running at 125 mL/hr. No BM. /141, refused insulin and pt is unable to tolerate PO intake. Pt's preference is to use the lidocaine and magic mouth wash before eating. ENT following. SP and Podiatry consulted. C-diff negative, pending viral stool panel. Enteric precautions maintained. Will continue to monitor.

## 2019-03-15 NOTE — PHARMACY-ADMISSION MEDICATION HISTORY
Admission medication history interview status for the 3/15/2019  admission is complete. See EPIC admission navigator for prior to admission medications     Medication history source reliability:Moderate    Actions taken by pharmacist (provider contacted, etc):interviewed patient and [facility].     Additional medication history information not noted on PTA med list :he has been unable to take most of his po meds due to mouth pain. I included them on his list since he intends to resume them. He has not used his lidoderm as he has been more concerned about his throat pain. His lantus is temporarily on hold while on radiation (included on list).    Medication reconciliation/reorder completed by provider prior to medication history? No    Time spent in this activity: 45 minutes    Prior to Admission medications    Medication Sig Last Dose Taking? Auth Provider   artificial saliva (BIOTENE MT) solution Swish and spit 1 spray in mouth as needed for dry mouth Unknown formulation  Yes Unknown, Entered By History   CARBOXYMETHYLCELLULOSE SODIUM OP Apply 1 drop to eye 4 times daily as needed Both eyes, 0.25%  Yes Unknown, Entered By History   fluocinonide (LIDEX) 0.05 % external cream Apply topically 2 times daily To chest and back  Yes Unknown, Entered By History   HYDROmorphone (DILAUDID) 2 MG tablet Take 2-4 mg by mouth every 4 hours as needed for severe pain 2 mg for moderate pain; 4 mg for severe pain  Yes Unknown, Entered By History   lidocaine VISCOUS (XYLOCAINE) 2 % solution Take 10 mLs by mouth every 4 hours as needed for moderate pain swish and spit every 3-8 hours as needed; max 8 doses/24 hour period  Yes Unknown, Entered By History   liraglutide (VICTOZA) 18 MG/3ML solution Inject 0.6 mg Subcutaneous daily  Yes Unknown, Entered By History   LORazepam (ATIVAN) 0.5 MG tablet Take 0.5 mg by mouth nightly as needed for anxiety or sleep  Yes Unknown, Entered By History   mineral oil-hydrophilic  petrolatum (AQUAPHOR) external ointment Apply topically 2 times daily To radiated skin and both feet  Yes Unknown, Entered By History   naloxone (NARCAN) 4 MG/0.1ML nasal spray Spray 4 mg into one nostril alternating nostrils once as needed for opioid reversal every 2-3 minutes until assistance arrives  Yes Unknown, Entered By History   sodium chloride 0.9%, bag, 0.9 % SOLN irrigation Irrigate with 20 mLs as directed every 4 hours Uses as an oral rinse  Yes Unknown, Entered By History   sucralfate (CARAFATE) 1 GM/10ML suspension Take 10 mLs by mouth 4 times daily as needed (sore mouth/throat)  Yes Unknown, Entered By History   triamcinolone (KENALOG) 0.025 % cream Apply topically 2 times daily Face and neck  Yes Unknown, Entered By History   acetaminophen (TYLENOL) 325 MG tablet Take 325-650 mg by mouth every 4 hours as needed for mild pain   Unknown, Entered By History   aspirin (ASA) 81 MG chewable tablet Take 81 mg by mouth daily   Unknown, Entered By History   atorvastatin (LIPITOR) 80 MG tablet Take 40 mg by mouth At Bedtime   Unknown, Entered By History   CALCIUM CARBONATE PO Take 650 mg by mouth 3 times daily 260 mg elemental Ca per tablet   Unknown, Entered By History   clopidogrel (PLAVIX) 75 MG tablet Take 75 mg by mouth daily   Unknown, Entered By History   docusate sodium (COLACE) 100 MG capsule Take 100 mg by mouth daily   Unknown, Entered By History   gabapentin (NEURONTIN) 300 MG capsule Take 600 mg by mouth 2 times daily   Unknown, Entered By History   insulin glargine (LANTUS SOLOSTAR PEN) 100 UNIT/ML pen Inject 10 Units Subcutaneous At Bedtime Stopped for now until after radiation is done. Hold for BGM <100.   Unknown, Entered By History   lidocaine (LIDODERM) 5 % patch Place 1 patch onto the skin At Bedtime To left shoulder. On for 12 hours; off for 12 hours. Has not used recently.   Unknown, Entered By History   loperamide (IMODIUM) 2 MG capsule Take 2 mg by mouth 4 times daily as needed for  diarrhea   Unknown, Entered By History   Magnesium Oxide 420 MG TABS Take 840 mg by mouth 2 times daily   Unknown, Entered By History   metoprolol tartrate (LOPRESSOR) 50 MG tablet Take 50 mg by mouth 2 times daily   Unknown, Entered By History   multivitamin w/minerals (MULTI-VITAMIN) tablet Take 1 tablet by mouth daily   Unknown, Entered By History   sertraline (ZOLOFT) 25 MG tablet Take 12.5 mg by mouth daily   Unknown, Entered By History   vitamin D3 (CHOLECALCIFEROL) 1000 units (25 mcg) tablet Take 1,000 Units by mouth daily   Unknown, Entered By History

## 2019-03-15 NOTE — CONSULTS
[facility]  CONSULTATION NOTE    ASSESSMENT:   68-year old male with type 2 DM (HgbA1C 7.8), redness and pain in the left hallux consistent with an ingrown toenail.  Possible localized infection.       PLAN:   The potential causes and nature of an ingrown toenail were discussed with the patient.  We reviewed the natural history/prognosis of the condition and potential risks if no treatment is provided.      Treatment options discussed included conservative management (oral antibiotics when infected, soaking of foot, adequate width shoes)  as well as surgical management (partial nail removal).  The pros and cons of both forms of treatment were reviewed.      After thorough discussion and answering all questions, Mr. Rae said he will consider his options.    I was not able to palpate pedal pulses and will order BYRON/ US to evaluate healing potential, should he opt for a partial nail avulsion. This procedure is typically done in clinic and does not warrant a trip to the OR.  It is possible it could be done at bedside, if appropriate instruments can be sent to the floor.  If he is discharged over the weekend, I favor follow up in clinic for the procedure.     Thank you for the consultation request and the opportunity to participate in this patient's care.       Edy Brown DPM, FACFAS, MS    [place] Department of Podiatry/Foot & Ankle Surgery    Pager:  [phone]      _______________________________________________________________________    CHIEF COMPLAINT:      I was asked by Dr. Bah to evaluate this patient for an ingrown toenail, left foot.    PATIENT HISTORY:  Clem Rea is a 68 year old male who was admitted for due to throat pain, diarrhea and poor p.o. intake. Other relevant history on admission reviewed in the H & P.   He has type 2 DM and denies previous ulcerations and foot infections. He denies numbness in his feet.  He has not seen a podiartist.  His wife fist noted  redness in his left great toe a couple weeks ago. It causes him pain. There is concern for an ingrown toenail and podiatry is consulted to evalaute.     Review of Systems:  A 10 point review of systems was performed and is positive for that noted above in the patient history.  All other areas are negative.     PAST MEDICAL HISTORY: No past medical history on file.     PAST SURGICAL HISTORY: No past surgical history on file.     MEDICATIONS:  Reviewed in Epic.     ALLERGIES:    Allergies   Allergen Reactions     Codeine      Fentanyl      Oxycodone      Penicillins      Pregabalin      Quetiapine         SOCIAL HISTORY:   Social History     Socioeconomic History     Marital status:      Spouse name: Not on file     Number of children: Not on file     Years of education: Not on file     Highest education level: Not on file   Occupational History     Not on file   Social Needs     Financial resource strain: Not on file     Food insecurity:     Worry: Not on file     Inability: Not on file     Transportation needs:     Medical: Not on file     Non-medical: Not on file   Tobacco Use     Smoking status: Not on file   Substance and Sexual Activity     Alcohol use: Not on file     Drug use: Not on file     Sexual activity: Not on file   Lifestyle     Physical activity:     Days per week: Not on file     Minutes per session: Not on file     Stress: Not on file   Relationships     Social connections:     Talks on phone: Not on file     Gets together: Not on file     Attends Church service: Not on file     Active member of club or organization: Not on file     Attends meetings of clubs or organizations: Not on file     Relationship status: Not on file     Intimate partner violence:     Fear of current or ex partner: Not on file     Emotionally abused: Not on file     Physically abused: Not on file     Forced sexual activity: Not on file   Other Topics Concern     Not on file   Social History Narrative     Not on  file        FAMILY HISTORY: No family history on file.     EXAM:Vitals: /60 (BP Location: Left arm)   Pulse 73   Temp 97.6  F (36.4  C) (Oral)   Resp 16   SpO2 97%   BMI= There is no height or weight on file to calculate BMI.    LABS:       Lab Results   Component Value Date    WBC 5.7 03/15/2019     Lab Results   Component Value Date    RBC 3.65 03/15/2019     Lab Results   Component Value Date    HGB 10.8 03/15/2019     Lab Results   Component Value Date    HCT 32.6 03/15/2019     No components found for: MCT  Lab Results   Component Value Date    MCV 89 03/15/2019     Lab Results   Component Value Date    MCH 29.6 03/15/2019     Lab Results   Component Value Date    MCHC 33.1 03/15/2019     Lab Results   Component Value Date    RDW 14.3 03/15/2019     Lab Results   Component Value Date     03/15/2019       Recent Labs   Lab Test 03/15/19  0759      POTASSIUM 4.8   CHLORIDE 106   CO2 27   ANIONGAP 9   *   BUN 48*   CR 2.71*   JORGE 8.6       General appearance: Patient is alert and fully cooperative with history & exam.  No sign of distress is noted during the visit.      Psychiatric: Affect is pleasant & appropriate.  Patient appears motivated to improve health.       Respiratory: Breathing is regular & unlabored while sitting.      HEENT: Hearing is intact to spoken word.  Speech is clear.  No gross evidence of visual impairment that would impact ambulation.       Vascular: DP & PT pulses are not readiyy palpable bilaterally.  No significant edema or varicosities noted.  CFT and skin temperature is normal to both lower extremities.       Neurologic: Lower extremity sensation is intact bilateral foot to light touch.  No evidence of weakness or contracture in the lower extremities.       Musculoskeletal: Patient is ambulatory without assistive device or brace.  No gross ankle or foot deformity noted.  Adequate LE strength and ankle ROM.     Dermatologic:  There is some redness and  tenderness along the medial skin fold of the left hallux nail unit. No drainage. No open wound.

## 2019-03-16 ENCOUNTER — APPOINTMENT (OUTPATIENT)
Dept: SPEECH THERAPY | Facility: CLINIC | Age: 69
DRG: 683 | End: 2019-03-16
Attending: INTERNAL MEDICINE
Payer: COMMERCIAL

## 2019-03-16 PROBLEM — R07.0 THROAT PAIN: Status: ACTIVE | Noted: 2019-03-16

## 2019-03-16 LAB
ANION GAP SERPL CALCULATED.3IONS-SCNC: 5 MMOL/L (ref 3–14)
BUN SERPL-MCNC: 39 MG/DL (ref 7–30)
CALCIUM SERPL-MCNC: 8.3 MG/DL (ref 8.5–10.1)
CHLORIDE SERPL-SCNC: 108 MMOL/L (ref 94–109)
CO2 SERPL-SCNC: 27 MMOL/L (ref 20–32)
CREAT SERPL-MCNC: 1.99 MG/DL (ref 0.66–1.25)
ERYTHROCYTE [DISTWIDTH] IN BLOOD BY AUTOMATED COUNT: 14.3 % (ref 10–15)
GFR SERPL CREATININE-BSD FRML MDRD: 33 ML/MIN/{1.73_M2}
GLUCOSE BLDC GLUCOMTR-MCNC: 110 MG/DL (ref 70–99)
GLUCOSE BLDC GLUCOMTR-MCNC: 113 MG/DL (ref 70–99)
GLUCOSE BLDC GLUCOMTR-MCNC: 123 MG/DL (ref 70–99)
GLUCOSE BLDC GLUCOMTR-MCNC: 135 MG/DL (ref 70–99)
GLUCOSE SERPL-MCNC: 117 MG/DL (ref 70–99)
HCT VFR BLD AUTO: 31.6 % (ref 40–53)
HGB BLD-MCNC: 9.9 G/DL (ref 13.3–17.7)
MCH RBC QN AUTO: 28.5 PG (ref 26.5–33)
MCHC RBC AUTO-ENTMCNC: 31.3 G/DL (ref 31.5–36.5)
MCV RBC AUTO: 91 FL (ref 78–100)
PLATELET # BLD AUTO: 207 10E9/L (ref 150–450)
POTASSIUM SERPL-SCNC: 4.6 MMOL/L (ref 3.4–5.3)
RBC # BLD AUTO: 3.47 10E12/L (ref 4.4–5.9)
SODIUM SERPL-SCNC: 140 MMOL/L (ref 133–144)
WBC # BLD AUTO: 4.2 10E9/L (ref 4–11)

## 2019-03-16 PROCEDURE — 25000128 H RX IP 250 OP 636: Performed by: INTERNAL MEDICINE

## 2019-03-16 PROCEDURE — 25000132 ZZH RX MED GY IP 250 OP 250 PS 637: Performed by: INTERNAL MEDICINE

## 2019-03-16 PROCEDURE — 92526 ORAL FUNCTION THERAPY: CPT | Mod: GN

## 2019-03-16 PROCEDURE — G0378 HOSPITAL OBSERVATION PER HR: HCPCS

## 2019-03-16 PROCEDURE — 00000146 ZZHCL STATISTIC GLUCOSE BY METER IP

## 2019-03-16 PROCEDURE — 99213 OFFICE O/P EST LOW 20 MIN: CPT | Performed by: PODIATRIST

## 2019-03-16 PROCEDURE — 25800030 ZZH RX IP 258 OP 636: Performed by: HOSPITALIST

## 2019-03-16 PROCEDURE — 25000132 ZZH RX MED GY IP 250 OP 250 PS 637: Performed by: HOSPITALIST

## 2019-03-16 PROCEDURE — 25000125 ZZHC RX 250: Performed by: HOSPITALIST

## 2019-03-16 PROCEDURE — 99225 ZZC SUBSEQUENT OBSERVATION CARE,LEVEL II: CPT | Performed by: INTERNAL MEDICINE

## 2019-03-16 PROCEDURE — 80048 BASIC METABOLIC PNL TOTAL CA: CPT | Performed by: HOSPITALIST

## 2019-03-16 PROCEDURE — 85027 COMPLETE CBC AUTOMATED: CPT | Performed by: HOSPITALIST

## 2019-03-16 PROCEDURE — 12000000 ZZH R&B MED SURG/OB

## 2019-03-16 PROCEDURE — 36415 COLL VENOUS BLD VENIPUNCTURE: CPT | Performed by: HOSPITALIST

## 2019-03-16 RX ORDER — HYDROMORPHONE HYDROCHLORIDE 1 MG/ML
.4-.8 INJECTION, SOLUTION INTRAMUSCULAR; INTRAVENOUS; SUBCUTANEOUS
Status: DISCONTINUED | OUTPATIENT
Start: 2019-03-16 | End: 2019-03-18 | Stop reason: HOSPADM

## 2019-03-16 RX ORDER — HYDROMORPHONE HYDROCHLORIDE 5 MG/5ML
2-4 SOLUTION ORAL
Status: DISCONTINUED | OUTPATIENT
Start: 2019-03-16 | End: 2019-03-18 | Stop reason: HOSPADM

## 2019-03-16 RX ADMIN — DIPHENHYDRAMINE HYDROCHLORIDE AND LIDOCAINE HYDROCHLORIDE AND ALUMINUM HYDROXIDE AND MAGNESIUM HYDRO 10 ML: KIT at 15:06

## 2019-03-16 RX ADMIN — GABAPENTIN 600 MG: 300 CAPSULE ORAL at 09:48

## 2019-03-16 RX ADMIN — Medication 0.8 MG: at 15:07

## 2019-03-16 RX ADMIN — HYDROMORPHONE HYDROCHLORIDE 4 MG: 5 SOLUTION ORAL at 21:04

## 2019-03-16 RX ADMIN — DIPHENHYDRAMINE HYDROCHLORIDE AND LIDOCAINE HYDROCHLORIDE AND ALUMINUM HYDROXIDE AND MAGNESIUM HYDRO 10 ML: KIT at 18:45

## 2019-03-16 RX ADMIN — LIDOCAINE HYDROCHLORIDE 15 ML: 20 SOLUTION ORAL; TOPICAL at 15:06

## 2019-03-16 RX ADMIN — LIDOCAINE HYDROCHLORIDE 15 ML: 20 SOLUTION ORAL; TOPICAL at 18:44

## 2019-03-16 RX ADMIN — Medication 0.8 MG: at 09:38

## 2019-03-16 RX ADMIN — ASPIRIN 81 MG 81 MG: 81 TABLET ORAL at 09:48

## 2019-03-16 RX ADMIN — HYDROMORPHONE HYDROCHLORIDE 0.5 MG: 1 INJECTION, SOLUTION INTRAMUSCULAR; INTRAVENOUS; SUBCUTANEOUS at 06:50

## 2019-03-16 RX ADMIN — SODIUM CHLORIDE, POTASSIUM CHLORIDE, SODIUM LACTATE AND CALCIUM CHLORIDE: 600; 310; 30; 20 INJECTION, SOLUTION INTRAVENOUS at 02:08

## 2019-03-16 RX ADMIN — SODIUM CHLORIDE, POTASSIUM CHLORIDE, SODIUM LACTATE AND CALCIUM CHLORIDE: 600; 310; 30; 20 INJECTION, SOLUTION INTRAVENOUS at 09:44

## 2019-03-16 RX ADMIN — SUCRALFATE 1 G: 1 SUSPENSION ORAL at 09:42

## 2019-03-16 RX ADMIN — METOPROLOL TARTRATE 50 MG: 50 TABLET ORAL at 09:47

## 2019-03-16 RX ADMIN — CLOPIDOGREL BISULFATE 75 MG: 75 TABLET, FILM COATED ORAL at 09:48

## 2019-03-16 RX ADMIN — SUCRALFATE 1 G: 1 SUSPENSION ORAL at 18:44

## 2019-03-16 RX ADMIN — GABAPENTIN 600 MG: 300 CAPSULE ORAL at 21:05

## 2019-03-16 RX ADMIN — SODIUM CHLORIDE 1000 ML: 9 INJECTION, SOLUTION INTRAVENOUS at 16:38

## 2019-03-16 RX ADMIN — LIDOCAINE HYDROCHLORIDE 15 ML: 20 SOLUTION ORAL; TOPICAL at 09:42

## 2019-03-16 RX ADMIN — ATORVASTATIN CALCIUM 40 MG: 40 TABLET, FILM COATED ORAL at 21:05

## 2019-03-16 RX ADMIN — SUCRALFATE 1 G: 1 SUSPENSION ORAL at 15:06

## 2019-03-16 RX ADMIN — HYDROMORPHONE HYDROCHLORIDE 4 MG: 5 SOLUTION ORAL at 12:13

## 2019-03-16 RX ADMIN — HYDROMORPHONE HYDROCHLORIDE 0.5 MG: 1 INJECTION, SOLUTION INTRAMUSCULAR; INTRAVENOUS; SUBCUTANEOUS at 02:08

## 2019-03-16 RX ADMIN — METOPROLOL TARTRATE 50 MG: 50 TABLET ORAL at 21:05

## 2019-03-16 RX ADMIN — DIPHENHYDRAMINE HYDROCHLORIDE AND LIDOCAINE HYDROCHLORIDE AND ALUMINUM HYDROXIDE AND MAGNESIUM HYDRO 10 ML: KIT at 09:41

## 2019-03-16 RX ADMIN — HYDROMORPHONE HYDROCHLORIDE 4 MG: 5 SOLUTION ORAL at 18:06

## 2019-03-16 ASSESSMENT — ACTIVITIES OF DAILY LIVING (ADL)
ADLS_ACUITY_SCORE: 24

## 2019-03-16 ASSESSMENT — MIFFLIN-ST. JEOR: SCORE: 1623.38

## 2019-03-16 NOTE — CONSULTS
"CLINICAL NUTRITION SERVICES  -  ASSESSMENT NOTE      Recommendations Ordered by Registered Dietitian (RD): Offered oral nutritional supplements but patient declined    Malnutrition:   % Weight Loss:  > 7.5% in 3 months (severe malnutrition)  % Intake:  </= 50% for >/= 5 days (severe malnutrition)  Subcutaneous Fat Loss:  None observed  Muscle Loss:  Temporal region mild depletion  Fluid Retention:  None noted    Malnutrition Diagnosis: Severe malnutrition  In Context of:  Acute illness or injury        REASON FOR ASSESSMENT  Clem Rae is a 68 year old male seen by Registered Dietitian for Admission Nutrition Risk Screen for unintentional loss of 10# or more in the past two months and reduced oral intake over the last month      NUTRITION HISTORY  - Information obtained from patient.  He reports that he was having issues with pain and poor appetite but this has been improving since admission.  He added that he does not have any swallowing issues but it's more of a feeling of a \"burn\" when foods rub on his tongue.  Patient has lost 50# since December due to pain with eating and poor appetite but added that he did get through all of that without needing a feeding tube.  \"Things are looking up now.  I'd like to try and increase my eating and avoid the supplements\".      CURRENT NUTRITION ORDERS  Diet Order:     Moderate Consistent Carbohydrate     Current Intake/Tolerance:  Patient reports that he has been eating 50% of meals over the last 24 hours.  Breakfast this morning was an omelet, Cream of Wheat, sausage, a blueberry muffin, and hot cocoa.  \"I'm getting ready to order a sandwich and some fruit\".      NUTRITION FOCUSED PHYSICAL ASSESSMENT (NFPA)  Completed:        Yes:          Partial assessment - Temporal     Observed  Muscle Wasting - Temporal   Obtained from Chart/Interdisciplinary Team  None noted    ANTHROPOMETRICS  Height: 5' 9\"  Weight: 86.3 kg (190#)(3/16)  Body mass index is 28.1 kg/m   Weight " Status:  Overweight BMI 25-29.9  IBW: 72.7 kg   % IBW: 119%  Weight History: Patient reports a 50# (21%) weight loss since December (3 months)    LABS  Labs reviewed    MEDICATIONS  Medications reviewed      ASSESSED NUTRITION NEEDS PER APPROVED PRACTICE GUIDELINES:    Dosing Weight 86.3 kg   Estimated Energy Needs: 8731-0135 kcals (25-30 Kcal/Kg)  Justification: maintenance  Estimated Protein Needs: 105-130 grams protein (1.2-1.5 g pro/Kg)  Justification: maintenance  Estimated Fluid Needs: 3197-3236 mL (1 mL/Kcal)  Justification: maintenance    MALNUTRITION:  % Weight Loss:  > 7.5% in 3 months (severe malnutrition)  % Intake:  </= 50% for >/= 5 days (severe malnutrition)  Subcutaneous Fat Loss:  None observed  Muscle Loss:  Temporal region mild depletion  Fluid Retention:  None noted    Malnutrition Diagnosis: Severe malnutrition  In Context of:  Acute illness or injury    NUTRITION DIAGNOSIS:  Inadequate oral intake related to improving appetite and pain as evidenced by consuming 50% of meals       NUTRITION INTERVENTIONS  Recommendations / Nutrition Prescription  Continue Moderate CHO diet as tolerated  Offered oral nutritional supplements but patient declined     Implementation  Nutrition education: Per Provider order if indicated     Nutrition Goals  Patient will increase intake to 75% of meals TID     MONITORING AND EVALUATION:  Progress towards goals will be monitored and evaluated per protocol and Practice Guidelines    Amanda Gage RD, LD, CNSC   Clinical Dietitian - [facility]

## 2019-03-16 NOTE — PROGRESS NOTES
[facility]    Medicine Progress Note - Hospitalist Service       Date of Admission:  3/15/2019  Assessment & Plan   Clem Rae is a 68 year old male who was admitted on 3/15/2019 for throat pain and poor PO intake secondary to mucositis/esophagitis from radiation treatment.     Acute kidney injury, prerenal, on chronic kidney disease stage III  Baseline creatinine around 2, was 2.71 on admission. Prerenal BERNARD due to poor PO intake.   - Discontinue IVF as diet advancing  - Recheck BMP in AM on oral intake alone     Diarrhea  Darker stools, with diarrhea. No elevation in WBC, afebrile, no abd pain. C diff PCR and enteric panel all negative. Hemoccult negative.  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 Has received chemo and radiation therapy in the past. Essentially has been NPO for days due to pain. ENT evaluated, no new developments, recommended to continue aggressive pain management and hydration  - Change hydromorphone PO to liquid formulation which he thinks he will tolerate better  - Increased hydromorphone IV to 0.4-0.8 mg, as he reports 0.3-0.5 mg both less effective and does not last long enough. Encouraged however to move primarily to oral options as approaching discharge and for longer acting relief  - Continue magic mouthwash, lidocaine, carafate PRN  - Next oncology appt 3/25 (Follow at [facility]). Consider oncology consult if no improvement with aggressive pain control     Diabetes  HgbA1c 7.8%. Prior to admission on Victoza.   - Continue sliding scale insulin      Coronary artery disease  Carotid artery stenosis  History of CVA  - Continue prior to admission aspirin, atorvastatin, clopidogrel      Hypertension  - Blood pressure adequately controlled. Continue metoprolol.      Left great toe ingrown toenail  - Podiatry consulted, plan for clinic follow-up   - US BYRON ordered per podiatry with no evidence for  significant lower extremity arterial insufficiency     Diet: Combination Diet 7927-1515 Calories: Moderate Consistent CHO (4-6 CHO units/meal)    DVT Prophylaxis: Pneumatic Compression Devices  [place] Catheter: not present  Code Status: Full Code      Disposition Plan   Expected discharge: Tomorrow, recommended to prior living arrangement once pain controlled and labs stable on oral intake/medications. .  Entered: Roberth Do MD 03/16/2019, 9:42 AM       The patient's care was discussed with the Bedside Nurse and Patient.    Roberth Do MD  Hospitalist Service  [facility]    ______________________________________________________________________    Interval History   No acute events overnight. Reports continued pain in throat, receiving IV hydromorphone, but does not feel it lasts long enough. Dose increased and higher dose has been more effective. Tolerating pills, but has not been preferentially using oral medications due to concern that they do not act fast enough. Intake of food and fluids has been increasing nonetheless. No diarrhea. Denies any other new symptoms.     Data reviewed today: I reviewed all medications, new labs and imaging results over the last 24 hours. I personally reviewed no images or EKG's today.    Physical Exam   Vital Signs: Temp: 98.3  F (36.8  C) Temp src: Oral BP: 122/51 Pulse: 65   Resp: 16 SpO2: 99 % O2 Device: None (Room air)    Weight: 190 lbs 4.11 oz    Constitutional: Well-appearing, NAD  Respiratory: Clear to auscultation bilaterally, good air movement bilaterally  Cardiovascular: RRR, no m/r/g. No peripheral edema.  GI: Soft, non-tender, non-distended. BS normoactive.   Skin/Integumen: Warm, dry  Other:      Data   Recent Labs   Lab 03/16/19  0856 03/15/19  1650 03/15/19  0759 03/15/19  0757   WBC 4.2  --   --  5.7   HGB 9.9*  --   --  10.8*   MCV 91  --   --  89     --   --  241    143 142  --    POTASSIUM 4.6 4.6 4.8  --    CHLORIDE 108  109 106  --    CO2 27 26 27  --    BUN 39* 45* 48*  --    CR 1.99* 2.46* 2.71*  --    ANIONGAP 5 8 9  --    JORGE 8.3* 8.5 8.6  --    * 139* 187*  --    ALBUMIN  --   --  3.2*  --    PROTTOTAL  --   --  7.1  --    BILITOTAL  --   --  0.3  --    ALKPHOS  --   --  79  --    ALT  --   --  29  --    AST  --   --  18  --        Recent Results (from the past 24 hour(s))   US BYRON Doppler No Exercise    Narrative    US BYRON DOPPLER NO EXERCISE, 1-2 LEVELS,??? BILAT  3/15/2019 7:34 PM     HISTORY: non palpable pedal pulses    COMPARISON: None.    FINDINGS:   The resting right and left ankle-brachial indices are 1.01 and 0.97  respectively. Waveform analysis indicates multiphasic waveforms in the  distal tibial arteries.      Impression    IMPRESSION: No evidence for significant lower extremity arterial  insufficiency.      BYRON Diagnostic Criteria      >/= 1.3          Non compressible  0.95-1.0          Normal  0.90-0.94        Mild PAD  0.50-0.89        Moderate PAD  0.20-0.49        Severe PAD  <0.20               Critical    DIEGO HINOJOSA MD     Medications     lactated ringers 125 mL/hr at 03/16/19 0208       aspirin  81 mg Oral Daily     atorvastatin  40 mg Oral At Bedtime     clopidogrel  75 mg Oral Daily     gabapentin  600 mg Oral BID     insulin aspart  1-7 Units Subcutaneous TID AC     insulin aspart  1-5 Units Subcutaneous At Bedtime     metoprolol tartrate  50 mg Oral BID

## 2019-03-16 NOTE — PROGRESS NOTES
[place] PODIATRY/FOOT & ANKLE SURGERY      Assessment:   68-year old male with type 2 DM (HgbA1C 7.8), with ingrown toenails, medial edge, bilateral hallux.  No changes since yesterday.   Non invasive vascular studies showing normal blood flow to both feet.    Plan:  I recommended follow up with [facility] in the next 1-2 weeks for partial nail avulsions, bilateral hallux.  I reviewed the procedure and aftercares, with the patient and his wife.  I offered doing this tomorrow in the OR.  He opted to wait for clinic follow up.  This is usually a clinic procedure.       I will place some foot-relevant discharge orders, including [facility] locations.     I recommend a 10-day course of an oral antibiotic at time of discharge: Augmentin or cephalexin.    Thank you for including us in Mr. Rae's care and we will see him in clinic.     Edy Brown, PA, FACFAS, MS  [facility] of Podiatry/Foot & Ankle Surgery  [facility], [facility], and [place] clinics  Pager:  [phone]    _______________________________________________________________________      Subjective:  No complaints.  His wife is present today.  She also questions if there is some involvement of the right hallux nail, as well as the left.     Exam:    B/P: 122/51, T: 98.3, P: 65, R: 16    Vascular: DP & PT pulses are not readily palpable bilaterally.  No significant edema or varicosities noted.  CFT and skin temperature is normal to both lower extremities.      US BYRON DOPPLER NO EXERCISE, 1-2 LEVELS,??? BILAT  3/15/2019 7:34 PM      HISTORY: non palpable pedal pulses     COMPARISON: None.     FINDINGS:   The resting right and left ankle-brachial indices are 1.01 and 0.97  respectively. Waveform analysis indicates multiphasic waveforms in the  distal tibial arteries.                                                             IMPRESSION: No evidence for significant lower extremity arterial  insufficiency.         Neurologic: Lower  extremity sensation is diminished bilateral foot to light touch.  No evidence of weakness or contracture in the lower extremities.       Musculoskeletal: Patient is ambulatory without assistive device or brace.  No gross ankle or foot deformity noted.  Adequate LE strength and ankle ROM.      Dermatologic:  There is some redness and tenderness along the medial skin fold of the left hallux nail unit. No drainage. No open wound.  There is some redness, no tenderness, medial nail unit of the right hallux.       Lab Results   Component Value Date    WBC 4.2 03/16/2019     Lab Results   Component Value Date    RBC 3.47 03/16/2019     Lab Results   Component Value Date    HGB 9.9 03/16/2019     Lab Results   Component Value Date    HCT 31.6 03/16/2019     No components found for: MCT  Lab Results   Component Value Date    MCV 91 03/16/2019     Lab Results   Component Value Date    MCH 28.5 03/16/2019     Lab Results   Component Value Date    MCHC 31.3 03/16/2019     Lab Results   Component Value Date    RDW 14.3 03/16/2019     Lab Results   Component Value Date     03/16/2019       All cultures:  No results for input(s): CULT in the last 168 hours.    Hemoglobin   Date Value Ref Range Status   03/16/2019 9.9 (L) 13.3 - 17.7 g/dL Final

## 2019-03-16 NOTE — PLAN OF CARE
Care Plan Summary Note:  ORIENTATION/BEHAVIOR: A&Ox4, pleasant, calm cooperative  ABNL VS/O2: VSS on RA except soft BP 90s/40s  MOBILITY/FALL RISK: SB with Walker/GB  PAIN MANAGMENT: 0.5 dilaudid q2 given x1 with good relief  DIET: mod carb  BOWEL/BLADDER: continent, voiding per urinal, no BM for shift  ABNL LAB/BG:  DRAIN/DEVICES: R PIV, LR at 125  SKIN: ingrown toe nail, podiatry following  TESTS/PROCEDURES: toe US  AGGRESSION TOOL COLOR: Green  D/C DAY/GOALS/PLACE: possible discharge Sunday pending progress.

## 2019-03-16 NOTE — PLAN OF CARE
"A&OX4, VSS on RA. C/o of burning pain in the throat managed with IV dilaudid. Pt did much better today at meal time. Pt premedicated with IV dilaudid, oral lidocaine and magic mouthwash shortly before meal consumption. Pt has been taking oral lidocaine in between bites to help with swallowing which has been effective. States,\" This is the most I have eaten in days!\"On regular diet. Up SBA with a walker. PTA meds resumed this shift. IVF changed to LR at 125mL/hr. Crt improving. Ingrown toenails noted on bilateral LE. Seen by Podiatrist. US BYRON of the BLE done this shift. Tentative plan to discharge on Sunday. No diarhhea reported this shift. Enteric iso d/ray as bacterial and viral panel came back negative.Continue to monitor.  "

## 2019-03-16 NOTE — PLAN OF CARE
Pt A & O x4, up w/ A1 GB and walker. VSS on RA. C/O of moderate throat pain. Dilaudid increased to 0.8 mg and pt has received x2, liquid dilaudid given x1. Appetite has increased from yesterday, good oral intake of fluids and ate 50% of breakfast. Pt refusing to get up in chair for meals, ambulated hallway x1. /110. IV SL, Cr 1.99. Pt is in agreement w/ plan to attempt food w/ liquid dilaudid tonight, refused to do so for lunch. Requested the IV dilaudid. Plan to possible discharge tomorrow, will continue to monitor.

## 2019-03-16 NOTE — PLAN OF CARE
Discharge Planner SLP   Patient plan for discharge: Per pt report, wants to ensure pain management with po intake prior to discharge.   Current status: Swallow Tx provided this AM. Per discussion with pt and signficant other, pt reports use of safe swallow precautions when globus sensation present secondary to throat pain, during po intake. Per pt report, pt able to resolve sensation with liquid wash. Pt reported having eaten dinner yesterday PM and breakfast earlier this AM, able to consume ~50% of meal in the absence of throat pain. Pt tolerated x1 bite of cracker during assessment, however reported immediate globus sensation of small particle cleared with x1 liquid wash. Initially within session, pt reported pain at level 0-1, with reported increased pain level of 3-4 following po intake. No overt s/sx of aspiration noted with thin liquids. SLP recommended selection of soft foods that are easier to masticate, as well as continued use of liquid wash to ensure complete clearance. Recommend continuation of regular solids with thin liquids with implementation of safe swallow precautions. ST will continue to follow to ensure tolerance.   Barriers to return to prior living situation: Medical status, pain management  Recommendations for discharge: Home with OP SLP services for continued close monitoring pending oncology plan  Rationale for recommendations: Pt will need ongoing SLP with specialty in head and neck cancer         Entered by: Kenyatta Simpson 03/16/2019 11:30 AM

## 2019-03-16 NOTE — UTILIZATION REVIEW
"  Admission Status; Secondary Review Determination         Under the authority of the Utilization Management Committee, the utilization review process indicated a secondary review on the above patient.  The review outcome is based on review of the medical records, discussions with staff, and applying clinical experience noted on the date of the review.          (x) Observation Status Appropriate - This patient does not meet hospital inpatient criteria and is placed in observation status. If this patient's primary payer is Medicare and was admitted as an inpatient, Condition Code 44 should be used and patient status changed to \"observation\".     RATIONALE FOR DETERMINATION   68 year old male who was admitted on 3/15/2019 for throat pain and poor PO intake secondary to mucositis/esophagitis from radiation treatment. Creatinine was elevated from baseline of 2 up to 2.7 on admission, he received IV fluid overnight, creatinine this morning is 1.9.  Was having diarrhea prior to admission however no diarrhea in the hospital.  Per nursing documentation he did much better with p.o. intake last evening eating his meal.  Expected discharge tomorrow per progress note. The severity of illness, intensity of service provided, expected LOS and risk for adverse outcome make the care appropriate for further observation; however, doesn't meet criteria for hospital inpatient admission. Dr. Do  notified of this determination.    This document was produced using voice recognition software.      The information on this document is developed by the utilization review team in order for the business office to ensure compliance.  This only denotes the appropriateness of proper admission status and does not reflect the quality of care rendered.         The definitions of Inpatient Status and Observation Status used in making the determination above are those provided in the CMS Coverage Manual, Chapter 1 and Chapter 6, section 70.4.    "   Sincerely,     SANG SIMON MD    System Medical Director  Utilization Management  [facility].

## 2019-03-17 ENCOUNTER — APPOINTMENT (OUTPATIENT)
Dept: PHYSICAL THERAPY | Facility: CLINIC | Age: 69
DRG: 683 | End: 2019-03-17
Attending: INTERNAL MEDICINE
Payer: COMMERCIAL

## 2019-03-17 ENCOUNTER — APPOINTMENT (OUTPATIENT)
Dept: SPEECH THERAPY | Facility: CLINIC | Age: 69
DRG: 683 | End: 2019-03-17
Attending: INTERNAL MEDICINE
Payer: COMMERCIAL

## 2019-03-17 PROBLEM — I95.1 ORTHOSTATIC HYPOTENSION: Status: ACTIVE | Noted: 2019-03-17

## 2019-03-17 LAB
ANION GAP SERPL CALCULATED.3IONS-SCNC: 5 MMOL/L (ref 3–14)
BUN SERPL-MCNC: 31 MG/DL (ref 7–30)
CALCIUM SERPL-MCNC: 8.2 MG/DL (ref 8.5–10.1)
CHLORIDE SERPL-SCNC: 110 MMOL/L (ref 94–109)
CO2 SERPL-SCNC: 26 MMOL/L (ref 20–32)
CREAT SERPL-MCNC: 1.77 MG/DL (ref 0.66–1.25)
GFR SERPL CREATININE-BSD FRML MDRD: 39 ML/MIN/{1.73_M2}
GLUCOSE BLDC GLUCOMTR-MCNC: 106 MG/DL (ref 70–99)
GLUCOSE BLDC GLUCOMTR-MCNC: 114 MG/DL (ref 70–99)
GLUCOSE BLDC GLUCOMTR-MCNC: 122 MG/DL (ref 70–99)
GLUCOSE BLDC GLUCOMTR-MCNC: 134 MG/DL (ref 70–99)
GLUCOSE BLDC GLUCOMTR-MCNC: 98 MG/DL (ref 70–99)
GLUCOSE SERPL-MCNC: 102 MG/DL (ref 70–99)
POTASSIUM SERPL-SCNC: 4.2 MMOL/L (ref 3.4–5.3)
SODIUM SERPL-SCNC: 141 MMOL/L (ref 133–144)

## 2019-03-17 PROCEDURE — 25000132 ZZH RX MED GY IP 250 OP 250 PS 637: Performed by: HOSPITALIST

## 2019-03-17 PROCEDURE — 97161 PT EVAL LOW COMPLEX 20 MIN: CPT | Mod: GP

## 2019-03-17 PROCEDURE — 36415 COLL VENOUS BLD VENIPUNCTURE: CPT | Performed by: INTERNAL MEDICINE

## 2019-03-17 PROCEDURE — 25000128 H RX IP 250 OP 636: Performed by: INTERNAL MEDICINE

## 2019-03-17 PROCEDURE — 25000132 ZZH RX MED GY IP 250 OP 250 PS 637: Performed by: INTERNAL MEDICINE

## 2019-03-17 PROCEDURE — G0378 HOSPITAL OBSERVATION PER HR: HCPCS

## 2019-03-17 PROCEDURE — 25000125 ZZHC RX 250: Performed by: HOSPITALIST

## 2019-03-17 PROCEDURE — 12000000 ZZH R&B MED SURG/OB

## 2019-03-17 PROCEDURE — 00000146 ZZHCL STATISTIC GLUCOSE BY METER IP

## 2019-03-17 PROCEDURE — 25800030 ZZH RX IP 258 OP 636: Performed by: INTERNAL MEDICINE

## 2019-03-17 PROCEDURE — 99233 SBSQ HOSP IP/OBS HIGH 50: CPT | Performed by: INTERNAL MEDICINE

## 2019-03-17 PROCEDURE — 80048 BASIC METABOLIC PNL TOTAL CA: CPT | Performed by: INTERNAL MEDICINE

## 2019-03-17 PROCEDURE — 92526 ORAL FUNCTION THERAPY: CPT | Mod: GN

## 2019-03-17 PROCEDURE — 97530 THERAPEUTIC ACTIVITIES: CPT | Mod: GP

## 2019-03-17 RX ORDER — POLYETHYLENE GLYCOL 3350 17 G/17G
17 POWDER, FOR SOLUTION ORAL DAILY PRN
Status: DISCONTINUED | OUTPATIENT
Start: 2019-03-17 | End: 2019-03-18 | Stop reason: HOSPADM

## 2019-03-17 RX ORDER — SENNOSIDES 8.6 MG
1-2 TABLET ORAL 2 TIMES DAILY
Status: DISCONTINUED | OUTPATIENT
Start: 2019-03-17 | End: 2019-03-17

## 2019-03-17 RX ORDER — SODIUM CHLORIDE 9 MG/ML
INJECTION, SOLUTION INTRAVENOUS CONTINUOUS
Status: DISCONTINUED | OUTPATIENT
Start: 2019-03-17 | End: 2019-03-18 | Stop reason: HOSPADM

## 2019-03-17 RX ADMIN — CLOPIDOGREL BISULFATE 75 MG: 75 TABLET, FILM COATED ORAL at 09:24

## 2019-03-17 RX ADMIN — SUCRALFATE 1 G: 1 SUSPENSION ORAL at 12:35

## 2019-03-17 RX ADMIN — SUCRALFATE 1 G: 1 SUSPENSION ORAL at 09:21

## 2019-03-17 RX ADMIN — SUCRALFATE 1 G: 1 SUSPENSION ORAL at 18:46

## 2019-03-17 RX ADMIN — HYDROMORPHONE HYDROCHLORIDE 4 MG: 5 SOLUTION ORAL at 18:45

## 2019-03-17 RX ADMIN — SODIUM CHLORIDE: 9 INJECTION, SOLUTION INTRAVENOUS at 11:32

## 2019-03-17 RX ADMIN — ASPIRIN 81 MG 81 MG: 81 TABLET ORAL at 09:24

## 2019-03-17 RX ADMIN — SENNOSIDES A AND B 5 ML: 415.36 LIQUID ORAL at 14:32

## 2019-03-17 RX ADMIN — HYDROMORPHONE HYDROCHLORIDE 4 MG: 5 SOLUTION ORAL at 21:34

## 2019-03-17 RX ADMIN — DIPHENHYDRAMINE HYDROCHLORIDE AND LIDOCAINE HYDROCHLORIDE AND ALUMINUM HYDROXIDE AND MAGNESIUM HYDRO 10 ML: KIT at 09:21

## 2019-03-17 RX ADMIN — LIDOCAINE HYDROCHLORIDE 15 ML: 20 SOLUTION ORAL; TOPICAL at 18:45

## 2019-03-17 RX ADMIN — HYDROMORPHONE HYDROCHLORIDE 4 MG: 5 SOLUTION ORAL at 01:13

## 2019-03-17 RX ADMIN — SODIUM CHLORIDE: 9 INJECTION, SOLUTION INTRAVENOUS at 15:40

## 2019-03-17 RX ADMIN — HYDROMORPHONE HYDROCHLORIDE 4 MG: 5 SOLUTION ORAL at 09:33

## 2019-03-17 RX ADMIN — DIPHENHYDRAMINE HYDROCHLORIDE AND LIDOCAINE HYDROCHLORIDE AND ALUMINUM HYDROXIDE AND MAGNESIUM HYDRO 10 ML: KIT at 12:35

## 2019-03-17 RX ADMIN — SODIUM CHLORIDE 500 ML: 9 INJECTION, SOLUTION INTRAVENOUS at 10:28

## 2019-03-17 RX ADMIN — LIDOCAINE HYDROCHLORIDE 15 ML: 20 SOLUTION ORAL; TOPICAL at 12:34

## 2019-03-17 RX ADMIN — SENNOSIDES A AND B 10 ML: 415.36 LIQUID ORAL at 21:34

## 2019-03-17 RX ADMIN — HYDROMORPHONE HYDROCHLORIDE 4 MG: 5 SOLUTION ORAL at 15:42

## 2019-03-17 RX ADMIN — HYDROMORPHONE HYDROCHLORIDE 4 MG: 5 SOLUTION ORAL at 06:33

## 2019-03-17 RX ADMIN — GABAPENTIN 600 MG: 300 CAPSULE ORAL at 21:35

## 2019-03-17 RX ADMIN — DIPHENHYDRAMINE HYDROCHLORIDE AND LIDOCAINE HYDROCHLORIDE AND ALUMINUM HYDROXIDE AND MAGNESIUM HYDRO 10 ML: KIT at 18:46

## 2019-03-17 RX ADMIN — ATORVASTATIN CALCIUM 40 MG: 40 TABLET, FILM COATED ORAL at 21:35

## 2019-03-17 RX ADMIN — LIDOCAINE HYDROCHLORIDE 15 ML: 20 SOLUTION ORAL; TOPICAL at 09:21

## 2019-03-17 RX ADMIN — HYDROMORPHONE HYDROCHLORIDE 4 MG: 5 SOLUTION ORAL at 12:33

## 2019-03-17 RX ADMIN — GABAPENTIN 600 MG: 300 CAPSULE ORAL at 09:24

## 2019-03-17 RX ADMIN — SODIUM CHLORIDE: 9 INJECTION, SOLUTION INTRAVENOUS at 23:45

## 2019-03-17 ASSESSMENT — ACTIVITIES OF DAILY LIVING (ADL)
ADLS_ACUITY_SCORE: 24

## 2019-03-17 ASSESSMENT — MIFFLIN-ST. JEOR: SCORE: 1621.38

## 2019-03-17 NOTE — PLAN OF CARE
Pt A & O x4, up w/ SBA. VSS on RA, Positive orthostatic BPs and symptomatic (dizziness/legs shaking).  mL bolus given, and NS running at 125 mL/hr continuously now. C/O of throat pain liquid dilaudid given x2 before meals, per pt request lidocaine/magic mouthwash/carafate given w/ meals as well. Pt has had an increase in oral intake, tolerating diet. Senokot ordered, BS+. /134. Discharge pending, will continue to monitor.

## 2019-03-17 NOTE — PROGRESS NOTES
03/17/19 1100   Quick Adds   Type of Visit Initial PT Evaluation   Living Environment   Lives With spouse   Living Arrangements house   Home Accessibility stairs to enter home   Number of Stairs, Main Entrance 3   Stair Railings, Main Entrance railing on right side (ascending)   Self-Care   Usual Activity Tolerance moderate   Current Activity Tolerance poor   Equipment Currently Used at Home walker, rolling  (4WW)   Functional Level Prior   Ambulation 3-->assistive equipment and person   Transferring 3-->assistive equipment and person   Toileting 3-->assistive equipment and person   Bathing 3-->assistive equipment and person   Communication 0-->understands/communicates without difficulty   Fall history within last six months yes   Number of times patient has fallen within last six months 8   Which of the above functional risks had a recent onset or change? ambulation;transferring;fall history   Prior Functional Level Comment Patient and spouse note that he has been ambulating with a 4WW at all times and spouse as been walking with patient and assisting him at home 2/2 to frequent falls from passing out. Otherwise, was independent with use of 4WW.    General Information   Onset of Illness/Injury or Date of Surgery - Date 03/15/19   Referring Physician Roberth Do MD   Patient/Family Goals Statement Return home    Pertinent History of Current Problem (include personal factors and/or comorbidities that impact the POC) Patient admitted on 3/15/19 for evaluation of hroat pain and poor PO intake secondary to mucositis/esophagitis from radiation treatment. 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    Precautions/Limitations fall precautions   Weight-Bearing Status - LLE full weight-bearing   Weight-Bearing Status - RLE full weight-bearing   General Observations Patient supine in bed  "upon arrival of therapist, agreeable to working with PT.    Cognitive Status Examination   Orientation orientation to person, place and time   Level of Consciousness alert   Follows Commands and Answers Questions 100% of the time;able to follow multistep instructions   Pain Assessment   Patient Currently in Pain No   Integumentary/Edema   Integumentary/Edema no deficits were identifed   Posture    Posture Forward head position   Range of Motion (ROM)   ROM Comment B LE ROM WNL    Strength   Strength Comments Not formally assessed but at least 3+/5 with functional transfers and gait    Bed Mobility   Bed Mobility Comments Supine>sit independently    Transfer Skills   Transfer Comments Sit>stand with FWW and SBA   Gait   Gait Comments Not assessed, not safe to initiate mobility away from EOB 2/2 to positive orthostatics    Balance   Balance Comments Sitting balance good with supervision    General Therapy Interventions   Planned Therapy Interventions strengthening;transfer training;gait training;home program guidelines   Clinical Impression   Criteria for Skilled Therapeutic Intervention yes, treatment indicated   PT Diagnosis Impaired mobility and gait    Influenced by the following impairments dizziness, weakness   Functional limitations due to impairments transfers, gait, stairs    Clinical Presentation Evolving/Changing   Clinical Presentation Rationale Based on PMH, current presentation, and social support    Clinical Decision Making (Complexity) Low complexity   Therapy Frequency` daily   Predicted Duration of Therapy Intervention (days/wks) 4 days   Anticipated Discharge Disposition Transitional Care Facility   Risk & Benefits of therapy have been explained Yes   Patient, Family & other staff in agreement with plan of care Yes   [facility] AM-PAC TM \"6 Clicks\"   2016, Trustees of [facility], under license to [employer].  All rights reserved.   6 Clicks Short Forms Basic Mobility Inpatient Short " "Form   [facility] AM-PAC  \"6 Clicks\" V.2 Basic Mobility Inpatient Short Form   1. Turning from your back to your side while in a flat bed without using bedrails? 4 - None   2. Moving from lying on your back to sitting on the side of a flat bed without using bedrails? 4 - None   3. Moving to and from a bed to a chair (including a wheelchair)? 3 - A Little   4. Standing up from a chair using your arms (e.g., wheelchair, or bedside chair)? 3 - A Little   5. To walk in hospital room? 2 - A Lot   6. Climbing 3-5 steps with a railing? 2 - A Lot   Basic Mobility Raw Score (Score out of 24.Lower scores equate to lower levels of function) 18   Total Evaluation Time   Total Evaluation Time (Minutes) 10     "

## 2019-03-17 NOTE — PLAN OF CARE
RN [id number]  Alert and oriented x4.  VSS except for + orthostatic hypotension-  1L bolus given over 2 hours. Up with SBA, instructed to be bedrest this evening due to symptomatic low BPs.   C/o throat pain-  liquid dilaudid before meals and liquid lido/magic mouthwash/nystatin with meals.  Tolerating diet. Wife at bedside, supportive.

## 2019-03-17 NOTE — PLAN OF CARE
Patient is alert and oriented.  Up with SBA.  Blood glucose 135 and 98.  Complains of throat pain, prn PO dilaudid as needed with good relief.  Lung sounds clear, 96% room air.  Continue to monitor.

## 2019-03-17 NOTE — PLAN OF CARE
Discharge Planner PT   Patient plan for discharge: Would like to go home   Current status: Orders received, chart reviewed, PT evaluation completed and treatment initiated. Patient admitted on 3/15/19 for evaluation of throat pain and poor PO intake secondary to mucositis/esophagitis from radiation treatment. 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 Patient lives in a house with his spouse with 3 steps to enter with single railing and all needs met on single level of home. Patient ambulates with a 4WW at baseline, however states recently his spouse has also had to follow him wherever he goes and A as needed 2/2 to dizziness and frequent falls.    Patient supine in bed upon arrival of therapist, agreeable to working with PT. Educated on role of PT and PT POC. Completed orthostatics during session: BP in supine 126/58 HR 69. Supine>sit independently. BP after sitting at EOB ~2 minutes 102/51 HR 76, minor dizziness noted. BP in sitting after 5 minutes 120/57 HR 73. Minor dizziness again noted. Patient wanting to trial standing. Sit<>stand with SBA and FWW. Attempted to obtain BP in standing but patient immediately with glazed over look in eyes and legs shaking, wanting to stand despite frequent cues to sit. Patient then agreeable to sit, min A to assist to sitting and Min A to transfer to supine. Patient able to respond to cues and provide one word answers while standing and then moving to sitting. More alert once in supine. BP in supiune at end of session 128/59. RN in room at end of session, aware of vitals and response to standing.   Barriers to return to prior living situation: dizziness, decreased tolerance to activity, current level of assist, falls risk   Recommendations for discharge: TCU   Rationale for recommendations: Patient most limited by dizziness and positive orthostatics at this time. Patient previously independent at baseline with 4WW and now  requiring increased level of A for mobility and unable to tolerate standing. Patient would benefit from continued skilled therapy to further improve strength, balance, and independence with mobility and ambulation to address functional limitations and decrease falls risk.  However, pending length of hospital stay and medical management of orthostatic hypotension, patient may progress to level of independence for safe discharge home. Will continue to monitor progress in therapy sessions.        Entered by: Livia Dunham 03/17/2019 11:47 AM

## 2019-03-17 NOTE — PLAN OF CARE
Discharge Planner SLP   Patient plan for discharge: Not stated this date.   Current status: Swallow Tx provided this AM. Pt reported presence of throat pain and refused po trials at this time, until next dosage of dilaudid. Per pt report, dilaudid has improved his overall pain management. Pt reported having consumed dinner last night, however had difficulty swallowing solid consistencies re: piece of banana bread, bread with chicken sandwich. SLP provided education regarding selection of moist and softer/easier to masticate items from menu, as well as use of safe swallow precautions during po intake including small bites/sips, alternate liquids/solids, liquid wash, sitting upright. SLP utilized teach back method and pt verbalized/demonstrated understanding. Pt reported implementation of liquid wash (x4) to relieve globus sensation of bread during meal with swallow, or expectoration of bolus. Recommend continuation of regular solids with thin liquids, due to high level of pt self-awareness during po intake, with recommendation of selection of soft/moist food items. Recommend use of safe swallow precautions including sitting upright, small bites/sips, alternate liquids/solids, liquid wash. ST will continue to follow to monitor tolerance and provide education regarding safe swallow precautions.   Barriers to return to prior living situation: Medical status, pain management  Recommendations for discharge: Home with OP SLP services for continued close monitoring pending oncology plan  Rationale for recommendations: Pt will need ongoing SLP with specialty in head and neck cancer       Entered by: Kenyatta Simpson 03/17/2019 8:45 AM

## 2019-03-17 NOTE — PROGRESS NOTES
[facility]    Medicine Progress Note - Hospitalist Service       Date of Admission:  3/15/2019    Assessment & Plan   Clem Rae is a 68 year-old male who was admitted on 3/15/2019 for throat pain and poor PO intake secondary to mucositis/esophagitis from radiation treatment.     Acute kidney injury, prerenal, on chronic kidney disease stage III  Baseline creatinine around 2, was 2.71 on admission. Prerenal BERNARD due to poor PO intake.   - Creatinine continues to improve, though resuming IVF as below   - Monitor BMP     Orthostatic hypotension  Reported light-headedness with standing with profoundly positive orthostatic vital signs 3/16/19 (->74 sitting to standing). He reports he has had symptoms with position changes since his last chemo/radiation treatment in mid-February. Potentially a chronic component, although with clear reasons for hypovolemia on admission with poor intake and diarrhea so need to rule out volume issue.   - Give additional 500 ml bolus and restart IVF  - Orthostatic vital signs qshift ordered. May re-bolus if needed as long as no signs of fluid overload  - Hold metoprolol   - Consider compression stockings if persistent symptoms when fluid replete  - Minimize use of narcotics as may be contributing  - PT consulted. Okay to work with PT, must utilize pauses with position changes, have assistance available and stop if prohibitive symptoms    Diarrhea, resolved  Darker stools, with diarrhea. No elevation in WBC, afebrile, no abd pain. C diff PCR and enteric panel all negative. Hemoccult negative.  - Now with constipation, see below    Constipation  No bowel movement since 3/15 despite advancement of diet  - Start scheduled senna given opioid needs. Titrate dose up as needed.   - Polyethylene glycol PRN     Tongue cancer with metastasis to the lymph nodes and worsening pain  [facility]. Has received chemo and radiation therapy in the past. Essentially has been NPO  for days due to pain. ENT evaluated, no new developments, recommended to continue aggressive pain management and hydration  - Continue hydromorphone PO liquid, pain much improved on this regimen  - Continue magic mouthwash, lidocaine, carafate PRN  - Next oncology appt 3/25 (Follows at [facility]), plan for outpatient follow-up unless acute issues while hospitalized     Diabetes  HgbA1c 7.8%. Prior to admission on Victoza.   - Continue sliding scale insulin      Coronary artery disease  Carotid artery stenosis  History of CVA  - Continue prior to admission aspirin, atorvastatin, clopidogrel   - Holding metoprolol as above     Hypertension  - Blood pressure adequately controlled, with orthostatic hypotension as above  - Holding metoprolol as above     Left great toe ingrown toenail  US BYRON ordered per podiatry with no evidence for significant lower extremity arterial insufficiency   - Podiatry consulted, plan for clinic follow-up     Diet: Combination Diet 5588-7611 Calories: Moderate Consistent CHO (4-6 CHO units/meal)    DVT Prophylaxis: Pneumatic Compression Devices  [place] Catheter: not present  Code Status: Full Code      Disposition Plan   Expected discharge: Patient with profound orthostatic hypotension which has persisted despite additional IVF bolus, now requiring IVF to be restarted. The chronicity of the patient's problems are not quickly resolved requiring multiple days in the hospital for IVF and medication adjustment, and therefore appropriate for inpatient level care.   Entered: Roberth Do MD 03/17/2019, 10:56 AM       The patient's care was discussed with the Bedside Nurse and Patient.    Roberth Do MD  Hospitalist Service  [facility]    ______________________________________________________________________    Interval History   Overnight reported light-headedness with standing. Orthostatic vital signs profoundly positive. Continues to have light-headedness with position changes  this morning. Denies chest pain or shortness of breath. Intake continues to improve, tolerated a large breakfast. Pain well-controlled with liquid hydromorphone.     Data reviewed today: I reviewed all medications, new labs and imaging results over the last 24 hours. I personally reviewed no images or EKG's today.    Physical Exam   Vital Signs: Temp: 98.5  F (36.9  C) Temp src: Oral BP: 114/51 Pulse: 67   Resp: 16 SpO2: 98 % O2 Device: None (Room air)    Weight: 189 lbs 13.06 oz    Constitutional: Well-appearing, NAD  Respiratory: Clear to auscultation bilaterally, good air movement bilaterally  Cardiovascular: RRR, no m/r/g. No peripheral edema.  GI: Soft, non-tender, non-distended. BS normoactive.   Skin/Integumen: Warm, dry  Other:      Data   Recent Labs   Lab 03/17/19  0816 03/16/19  0856 03/15/19  1650 03/15/19  0759  03/15/19  0757   WBC  --  4.2  --   --   --  5.7   HGB  --  9.9*  --   --   --  10.8*   MCV  --  91  --   --   --  89   PLT  --  207  --   --   --  241    140 143 142   < >  --    POTASSIUM 4.2 4.6 4.6 4.8   < >  --    CHLORIDE 110* 108 109 106   < >  --    CO2 26 27 26 27   < >  --    BUN 31* 39* 45* 48*   < >  --    CR 1.77* 1.99* 2.46* 2.71*   < >  --    ANIONGAP 5 5 8 9   < >  --    JORGE 8.2* 8.3* 8.5 8.6   < >  --    * 117* 139* 187*   < >  --    ALBUMIN  --   --   --  3.2*  --   --    PROTTOTAL  --   --   --  7.1  --   --    BILITOTAL  --   --   --  0.3  --   --    ALKPHOS  --   --   --  79  --   --    ALT  --   --   --  29  --   --    AST  --   --   --  18  --   --     < > = values in this interval not displayed.       No results found for this or any previous visit (from the past 24 hour(s)).  Medications     sodium chloride         sodium chloride 0.9%  500 mL Intravenous Once     aspirin  81 mg Oral Daily     atorvastatin  40 mg Oral At Bedtime     clopidogrel  75 mg Oral Daily     gabapentin  600 mg Oral BID     insulin aspart  1-7 Units Subcutaneous TID AC     insulin  aspart  1-5 Units Subcutaneous At Bedtime     metoprolol tartrate  50 mg Oral BID

## 2019-03-18 ENCOUNTER — APPOINTMENT (OUTPATIENT)
Dept: PHYSICAL THERAPY | Facility: CLINIC | Age: 69
DRG: 683 | End: 2019-03-18
Attending: INTERNAL MEDICINE
Payer: COMMERCIAL

## 2019-03-18 VITALS
RESPIRATION RATE: 18 BRPM | DIASTOLIC BLOOD PRESSURE: 64 MMHG | TEMPERATURE: 97.7 F | OXYGEN SATURATION: 98 % | SYSTOLIC BLOOD PRESSURE: 156 MMHG | HEIGHT: 69 IN | HEART RATE: 68 BPM | BODY MASS INDEX: 28.77 KG/M2 | WEIGHT: 194.22 LBS

## 2019-03-18 LAB
ANION GAP SERPL CALCULATED.3IONS-SCNC: 5 MMOL/L (ref 3–14)
BUN SERPL-MCNC: 22 MG/DL (ref 7–30)
CALCIUM SERPL-MCNC: 8.4 MG/DL (ref 8.5–10.1)
CHLORIDE SERPL-SCNC: 112 MMOL/L (ref 94–109)
CO2 SERPL-SCNC: 25 MMOL/L (ref 20–32)
CREAT SERPL-MCNC: 1.55 MG/DL (ref 0.66–1.25)
GFR SERPL CREATININE-BSD FRML MDRD: 45 ML/MIN/{1.73_M2}
GLUCOSE BLDC GLUCOMTR-MCNC: 105 MG/DL (ref 70–99)
GLUCOSE BLDC GLUCOMTR-MCNC: 115 MG/DL (ref 70–99)
GLUCOSE BLDC GLUCOMTR-MCNC: 158 MG/DL (ref 70–99)
GLUCOSE SERPL-MCNC: 110 MG/DL (ref 70–99)
POTASSIUM SERPL-SCNC: 3.9 MMOL/L (ref 3.4–5.3)
SODIUM SERPL-SCNC: 142 MMOL/L (ref 133–144)

## 2019-03-18 PROCEDURE — 25000125 ZZHC RX 250: Performed by: HOSPITALIST

## 2019-03-18 PROCEDURE — 36415 COLL VENOUS BLD VENIPUNCTURE: CPT | Performed by: INTERNAL MEDICINE

## 2019-03-18 PROCEDURE — 80048 BASIC METABOLIC PNL TOTAL CA: CPT | Performed by: INTERNAL MEDICINE

## 2019-03-18 PROCEDURE — 00000146 ZZHCL STATISTIC GLUCOSE BY METER IP

## 2019-03-18 PROCEDURE — 25800030 ZZH RX IP 258 OP 636: Performed by: INTERNAL MEDICINE

## 2019-03-18 PROCEDURE — 25000132 ZZH RX MED GY IP 250 OP 250 PS 637: Performed by: INTERNAL MEDICINE

## 2019-03-18 PROCEDURE — 99239 HOSP IP/OBS DSCHRG MGMT >30: CPT | Performed by: INTERNAL MEDICINE

## 2019-03-18 PROCEDURE — 25000132 ZZH RX MED GY IP 250 OP 250 PS 637: Performed by: HOSPITALIST

## 2019-03-18 PROCEDURE — 97530 THERAPEUTIC ACTIVITIES: CPT | Mod: GP

## 2019-03-18 PROCEDURE — 97116 GAIT TRAINING THERAPY: CPT | Mod: GP

## 2019-03-18 PROCEDURE — 25000131 ZZH RX MED GY IP 250 OP 636 PS 637: Performed by: INTERNAL MEDICINE

## 2019-03-18 RX ORDER — SUCRALFATE ORAL 1 G/10ML
10 SUSPENSION ORAL 4 TIMES DAILY PRN
Qty: 420 ML | Refills: 0 | Status: SHIPPED | OUTPATIENT
Start: 2019-03-18

## 2019-03-18 RX ORDER — HYDROMORPHONE HYDROCHLORIDE 1 MG/ML
SOLUTION ORAL
Qty: 160 ML | Refills: 0 | Status: SHIPPED | OUTPATIENT
Start: 2019-03-18

## 2019-03-18 RX ORDER — DIPHENHYDRAMINE HYDROCHLORIDE AND LIDOCAINE HYDROCHLORIDE AND ALUMINUM HYDROXIDE AND MAGNESIUM HYDRO
10 KIT EVERY 6 HOURS PRN
Qty: 237 ML | Refills: 0 | Status: SHIPPED | OUTPATIENT
Start: 2019-03-18

## 2019-03-18 RX ADMIN — LIDOCAINE HYDROCHLORIDE 15 ML: 20 SOLUTION ORAL; TOPICAL at 12:37

## 2019-03-18 RX ADMIN — ASPIRIN 81 MG 81 MG: 81 TABLET ORAL at 09:26

## 2019-03-18 RX ADMIN — HYDROMORPHONE HYDROCHLORIDE 4 MG: 5 SOLUTION ORAL at 00:36

## 2019-03-18 RX ADMIN — DIPHENHYDRAMINE HYDROCHLORIDE AND LIDOCAINE HYDROCHLORIDE AND ALUMINUM HYDROXIDE AND MAGNESIUM HYDRO 10 ML: KIT at 09:26

## 2019-03-18 RX ADMIN — Medication 1 SPRAY: at 12:37

## 2019-03-18 RX ADMIN — HYDROMORPHONE HYDROCHLORIDE 4 MG: 5 SOLUTION ORAL at 03:28

## 2019-03-18 RX ADMIN — Medication 1 SPRAY: at 09:26

## 2019-03-18 RX ADMIN — SENNOSIDES A AND B 10 ML: 415.36 LIQUID ORAL at 09:26

## 2019-03-18 RX ADMIN — SUCRALFATE 1 G: 1 SUSPENSION ORAL at 12:37

## 2019-03-18 RX ADMIN — HYDROMORPHONE HYDROCHLORIDE 4 MG: 5 SOLUTION ORAL at 09:26

## 2019-03-18 RX ADMIN — CLOPIDOGREL BISULFATE 75 MG: 75 TABLET, FILM COATED ORAL at 09:25

## 2019-03-18 RX ADMIN — SUCRALFATE 1 G: 1 SUSPENSION ORAL at 09:26

## 2019-03-18 RX ADMIN — LIDOCAINE HYDROCHLORIDE 15 ML: 20 SOLUTION ORAL; TOPICAL at 09:26

## 2019-03-18 RX ADMIN — HYDROMORPHONE HYDROCHLORIDE 4 MG: 5 SOLUTION ORAL at 12:37

## 2019-03-18 RX ADMIN — SODIUM CHLORIDE: 9 INJECTION, SOLUTION INTRAVENOUS at 07:50

## 2019-03-18 RX ADMIN — INSULIN ASPART 1 UNITS: 100 INJECTION, SOLUTION INTRAVENOUS; SUBCUTANEOUS at 13:05

## 2019-03-18 ASSESSMENT — ACTIVITIES OF DAILY LIVING (ADL)
ADLS_ACUITY_SCORE: 24

## 2019-03-18 ASSESSMENT — MIFFLIN-ST. JEOR: SCORE: 1641.38

## 2019-03-18 NOTE — PLAN OF CARE
Discharge Planner PT   Patient plan for discharge: did not state  Current status: Pt demonstrates independence with bed mobility; requires SBA for transfers and gait x 400' with a FWW. Orthostatics taken and negative, see flowsheet. Pt denies any dizziness or light headedness with mobility or changes in position. IVF running.  Barriers to return to prior living situation: medical status, stairs  Recommendations for discharge: home   Rationale for recommendations: Pt is mobilizing well. Anticipate home if medical status/BP improves.       Entered by: Aisha Washington 03/18/2019 11:23 AM

## 2019-03-18 NOTE — PLAN OF CARE
Alert and oriented.  Up with one assist and walker/belt.  Orthostatic blood pressures done per orders. IV fluids infusing per orders.  Complains of throat pain, receives PO dilaudid every 3 hours with good relief.  Lung sounds clear, 96% room air.  LUCIAN hose in place.  Blood glucose 122 and 105

## 2019-03-18 NOTE — PROGRESS NOTES
"Discharge    Patient discharged to home with wife.  Care plan note  Pt is A/Ox4. Up with DEEPTHI, TONYA, walker. VSS on RA. C/o throat pain, managed with dilaudid suspension/lidocaine kartik/Carafate/magic mouth wash. Pt denies dizziness today/orthostatics negative. Pt states \"I feel great today, I could do marathon\". LS diminished. +BS/no bm today. Voiding. Eating well today. Discharged home with his wife at 3 pm  Listed belongings gathered and returned to patient. Yes  Care Plan and Patient education resolved: Yes  Prescriptions if needed, hard copies sent with patient  NA  Home and hospital acquired medications returned to patient: NA  Medication Bin checked and emptied on discharge Yes  Follow up appointment made for patient: No    "

## 2019-03-18 NOTE — PLAN OF CARE
Physical Therapy Discharge Summary    Reason for therapy discharge:    Discharged to home.    Progress towards therapy goal(s). See goals on Care Plan in [facility] electronic health record for goal details.  Goals partially met.  Barriers to achieving goals:   discharge from facility.    Therapy recommendation(s):    Continue home exercise program. Anticipate pt will progress to independence with mobility without further skilled PT intervention.

## 2019-03-18 NOTE — PROGRESS NOTES
Speech Language Therapy Discharge Summary    Reason for therapy discharge:    Discharged to home with outpatient therapy.    Progress towards therapy goal(s). See goals on Care Plan in [facility] electronic health record for goal details.  Goals partially met.  Barriers to achieving goals:   discharge from facility.    Therapy recommendation(s):    Continued therapy is recommended.  Rationale/Recommendations:  see last SLP note from 3/17.     Discharge Planner SLP   Patient plan for discharge: Not stated this date.   Current status: Swallow Tx provided this AM. Pt reported presence of throat pain and refused po trials at this time, until next dosage of dilaudid. Per pt report, dilaudid has improved his overall pain management. Pt reported having consumed dinner last night, however had difficulty swallowing solid consistencies re: piece of banana bread, bread with chicken sandwich. SLP provided education regarding selection of moist and softer/easier to masticate items from menu, as well as use of safe swallow precautions during po intake including small bites/sips, alternate liquids/solids, liquid wash, sitting upright. SLP utilized teach back method and pt verbalized/demonstrated understanding. Pt reported implementation of liquid wash (x4) to relieve globus sensation of bread during meal with swallow, or expectoration of bolus. Recommend continuation of regular solids with thin liquids, due to high level of pt self-awareness during po intake, with recommendation of selection of soft/moist food items. Recommend use of safe swallow precautions including sitting upright, small bites/sips, alternate liquids/solids, liquid wash. ST will continue to follow to monitor tolerance and provide education regarding safe swallow precautions.   Barriers to return to prior living situation: Medical status, pain management  Recommendations for discharge: Home with OP SLP services for continued close monitoring pending oncology  plan  Rationale for recommendations: Pt will need ongoing SLP with specialty in head and neck cancer

## 2019-03-18 NOTE — DISCHARGE SUMMARY
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  The following labs/tests are recommended: BMP. [facility] with [facility]   prov[facility] as previously scheduled.           Follow Up      Until follow up with Podiatry as an outpatient, I recommend daily soaking   of both feet in luke-warm soap water (dish soap) or luke-warm saltwater   solution;  15-20 min BID.      *PLEASE CALL [phone] TO SCHEDULE*  --------------------------------------------------------------------------  DR. RENU ALLAN  MONDAY - [place]  TUESDAY - [place]  [address] [facility]   [address] [facility] [address]  [place] [address]   [place] [address] [phone]    THURSDAY AM - [place]  [facility]SD[facility] PM - [facility]  [address] [facility] [address]  [place] [address]   [place] [address] [phone]    FRIDAY AM - [place]  [address] [place] [address] [phone]  --------------------------------------------------------------------------  DR. BRENTON OSEGUERA  MONDAY - [place]  WEDNESDAY - [place]  [address]     [address] [facility] [place] [address]  [phone]     [facility]  [address] [place]  [address]  [phone]  --------------------------------------------------------------------------  DR. CIRILO STACY  MONDAY - [place]  TUESDAY & FRIDAY AM - [place]  [address]          [address]  [place] [address]        [place] [address]  [phone]           WEDNESDAY - [place]        [address] [facility] [place] [address]  [phone]  ---------------------------------------------------------------------------    DR. DILEEP ANDRADE  MONDAY AM - [place]  WEDNESDAY - [place]  [address] [facility]   [address] [place] [address]   [place] [address] [phone]    TUESDAY - [place] FRIDAY PM - [place]  [address]   [address]  [place] [address]  [place], [place] [id number] [phone]         Hospital Course   Clem Rae is a 68 year-old male who was admitted on 3/15/2019 for throat pain and poor PO intake secondary to mucositis/esophagitis from radiation treatment.     Acute kidney injury, prerenal, on chronic kidney disease stage III  Baseline creatinine around 2 reportedly, was 2.71 on admission. Prerenal BERNARD due to poor PO intake.   - Creatinine continued to improve with IVF during admission, at 1.55 on day of discharge    Orthostatic hypotension  Reported light-headedness with standing with profoundly positive orthostatic vital signs 3/16/19 (->74 sitting to standing). He reports he has had symptoms with position changes since his last chemo/radiation treatment in mid-February. Potentially a chronic component, although with clear reasons for hypovolemia on admission with poor intake and diarrhea  - Symptoms and orthostatic hypotension resolved with additional IVF, holding of metoprolol, compression stockings  - Continue holding metoprolol on discharge. May need to tolerate short-term resting hypertension, can consider re-challenging at potentially lower dose if symptoms  remain resolved and intake at baseline    Physical deconditioning   - PT consulted, recommended discharge home    Diarrhea, resolved  Darker stools, with diarrhea. No elevation in WBC, afebrile, no abd pain. C diff PCR and enteric panel all negative. Hemoccult negative.  - Now with constipation, see below    Constipation  No bowel movement since 3/15 despite advancement of diet  - Started scheduled senna given opioid needs. Recommended to continue on discharge.      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 Has received chemo and radiation therapy in the past. Essentially has been NPO for days prior to admission due to pain. ENT evaluated, no new developments, recommended to continue aggressive pain management and hydration.  - Pain much improved with hydromorphone PO liquid. Discharged to use four times daily with meals and at bedtime. Continue magic mouthwash, lidocaine, carafate PRN  - [facility] as previously [facility]    Diabetes  HgbA1c 7.8%. Continue prior to admission Victoza.      Coronary artery disease  Carotid artery stenosis  History of CVA  - Continue prior to admission aspirin, atorvastatin, clopidogrel   - Holding metoprolol as above     Hypertension  With orthostatic hypotension as above. Holding metoprolol as above     Left great toe ingrown toenail  US BYRON ordered per podiatry with no evidence for significant lower extremity arterial insufficiency   - Podiatry consulted, plan for clinic follow-up     Consultations This Hospital Stay   SWALLOW EVAL SPEECH PATH AT BEDSIDE IP CONSULT  ENT IP CONSULT  PODIATRY IP CONSULT  PHYSICAL THERAPY ADULT IP CONSULT    Code Status   Full Code    Time Spent on this Encounter   I, Roberth Do, personally saw the patient today and spent greater than 30 minutes discharging this patient.       Roberth Do MD  [facility]  ______________________________________________________________________    Physical Exam   Vital Signs: Temp: 97.7  F (36.5  C) Temp src: Oral BP: 156/64 Pulse: 68   Resp: 18 SpO2: 98 % O2 Device: None (Room air)    Weight: 194 lbs 3.6 oz    Constitutional: Well-appearing, NAD  Respiratory: Clear to auscultation bilaterally, good air movement bilaterally  Cardiovascular: RRR. No peripheral edema.  GI: Soft, non-tender, non-distended.   Skin/Integumen: Warm, dry  Other:        Primary Care Physician   Patria Gutiérrez    Discharge Disposition   Discharged to home  Condition at discharge: Stable      Discharge Orders      Follow Up    Until follow up with Podiatry as an outpatient, I recommend daily soaking of both feet in luke-warm soap water (dish soap) or luke-warm saltwater solution;  15-20 min BID.      *PLEASE CALL [phone] TO SCHEDULE*  --------------------------------------------------------------------------  DR. RENU ALLAN  MONDAY - [place]  TUESDAY - [place]  [address] [facility]   [address]  [place] [address]   [place] [address] [phone]    THURSDAY AM - [place]  THURSDAY PM - [place]  [address] [facility] [address]  [place] [address]   [place] [address] [phone]    FRIDAY AM - [place]  [address] [place] [address] [phone]  --------------------------------------------------------------------------  DR. BRENTON OSEGUERA  MONDAY - [place]  WEDNESDAY - [place]  [address]    [address] [facility] [address] [place]   [place] [address]  [phone]     THURSDAY - [place]  [address] [place] [address]  [phone]  --------------------------------------------------------------------------  DR. CIRILO STACY  MONDAY - [place]  TUESDAY & FRIDAY AM - [place]  [address]          [address]  [place] [address]        [place]  [address]  [phone]           WEDNESDAY - [place]        [address] [place] [address]  [phone]  ---------------------------------------------------------------------------  DR. DILEEP ANDRADE  MONDAY AM - [place]  WEDNESDAY - [place]  [address]   [address] [place] [address]   [place], [place] [id number] [phone]    TUESDAY - [place] FRIDAY PM - [place]  [address]   [address]  [place] [address]  [place], [place] [id number] [phone]     Reason for your hospital stay    You were hospitalized for throat pain which led to poor intake and dehydration.     Follow-up and recommended labs and tests     Follow-up with primary care provider  within 7 days for hospital follow- up.  The following labs/tests are recommended: BMP. [facility] with your [facility] providers as previously scheduled.     Activity    Your activity upon discharge: activity as tolerated     Discharge Instructions    Continue compression stockings during daytime hours.     Full Code    Per previous documentation.     Diet    Follow this diet upon discharge: Moderate consistent carbohydrate diet.     Discharge Medications   Current Discharge Medication List      START taking these medications    Details   HYDROmorphone, STANDARD CONC, (DILAUDID) 1 MG/ML oral solution Take 4 mg four times daily as needed, with meals and at bedtime.  Qty: 160 mL, Refills: 0    Associated Diagnoses: Throat pain      magic mouthwash (FIRST-MOUTHWASH BLM) compounding kit Swish and swallow 10 mLs in mouth every 6 hours as needed for sore throat  Qty: 237 mL, Refills: 0    Associated Diagnoses: Throat pain         CONTINUE these medications which have CHANGED    Details   lidocaine VISCOUS (XYLOCAINE) 2 % solution Take 15 mL three times daily as needed with meals.  Qty: 450 mL, Refills: 0    Associated Diagnoses: Throat pain      sucralfate (CARAFATE) 1 GM/10ML  suspension Take 10 mLs (1 g) by mouth 4 times daily as needed (sore mouth/throat)  Qty: 420 mL, Refills: 0    Associated Diagnoses: Throat pain         CONTINUE these medications which have NOT CHANGED    Details   acetaminophen (TYLENOL) 325 MG tablet Take 325-650 mg by mouth every 4 hours as needed for mild pain      artificial saliva (BIOTENE MT) solution Swish and spit 1 spray in mouth as needed for dry mouth Unknown formulation      aspirin (ASA) 81 MG chewable tablet Take 81 mg by mouth daily      atorvastatin (LIPITOR) 80 MG tablet Take 40 mg by mouth At Bedtime      CALCIUM CARBONATE PO Take 650 mg by mouth 3 times daily 260 mg elemental Ca per tablet      clopidogrel (PLAVIX) 75 MG tablet Take 75 mg by mouth daily      gabapentin (NEURONTIN) 300 MG capsule Take 600 mg by mouth 2 times daily      lidocaine (LIDODERM) 5 % patch Place 1 patch onto the skin At Bedtime To left shoulder. On for 12 hours; off for 12 hours. Has not used recently.      liraglutide (VICTOZA) 18 MG/3ML solution Inject 0.6 mg Subcutaneous daily      loperamide (IMODIUM) 2 MG capsule Take 2 mg by mouth 4 times daily as needed for diarrhea      LORazepam (ATIVAN) 0.5 MG tablet Take 0.5 mg by mouth nightly as needed for anxiety or sleep      Magnesium Oxide 420 MG TABS Take 840 mg by mouth 2 times daily      mineral oil-hydrophilic petrolatum (AQUAPHOR) external ointment Apply topically 2 times daily To radiated skin and both feet      naloxone (NARCAN) 4 MG/0.1ML nasal spray Spray 4 mg into one nostril alternating nostrils once as needed for opioid reversal every 2-3 minutes until assistance arrives      vitamin D3 (CHOLECALCIFEROL) 1000 units (25 mcg) tablet Take 1,000 Units by mouth daily      docusate sodium (COLACE) 100 MG capsule Take 100 mg by mouth daily      fluocinonide (LIDEX) 0.05 % external cream Apply topically 2 times daily To chest and back      multivitamin w/minerals (MULTI-VITAMIN) tablet Take 1 tablet by mouth daily       triamcinolone (KENALOG) 0.025 % cream Apply topically 2 times daily Face and neck         STOP taking these medications       HYDROmorphone (DILAUDID) 2 MG tablet Comments:   Reason for Stopping:         insulin glargine (LANTUS SOLOSTAR PEN) 100 UNIT/ML pen Comments:   Reason for Stopping:         metoprolol tartrate (LOPRESSOR) 50 MG tablet Comments:   Reason for Stopping:               Significant Results and Procedures   Most Recent 3 CBC's:  Recent Labs   Lab Test 03/16/19  0856 03/15/19  0757   WBC 4.2 5.7   HGB 9.9* 10.8*   MCV 91 89    241     Most Recent 3 BMP's:  Recent Labs   Lab Test 03/18/19  0937 03/17/19  0816 03/16/19  0856    141 140   POTASSIUM 3.9 4.2 4.6   CHLORIDE 112* 110* 108   CO2 25 26 27   BUN 22 31* 39*   CR 1.55* 1.77* 1.99*   ANIONGAP 5 5 5   JORGE 8.4* 8.2* 8.3*   * 102* 117*     Most Recent 2 LFT's:  Recent Labs   Lab Test 03/15/19  0759   AST 18   ALT 29   ALKPHOS 79   BILITOTAL 0.3     Most Recent 3 INR's:No lab results found.  Most Recent 3 Troponin's:No lab results found.  Most Recent 3 BNP's:No lab results found.  Most Recent Cholesterol Panel:No lab results found.  Most Recent 6 Bacteria Isolates From Any Culture (See EPIC Reports for Culture Details):No lab results found.  Most Recent TSH and T4:No lab results found.  Most Recent Hemoglobin A1c:  Recent Labs   Lab Test 03/15/19  0759   A1C 7.8*     Most Recent Urinalysis:No lab results found.  Most Recent ABG:No lab results found.  Most Recent ESR & CRP:No lab results found.,   Results for orders placed or performed during the hospital encounter of 03/15/19   US BYRON Doppler No Exercise    Narrative    US BYRON DOPPLER NO EXERCISE, 1-2 LEVELS,??? BILAT  3/15/2019 7:34 PM     HISTORY: non palpable pedal pulses    COMPARISON: None.    FINDINGS:   The resting right and left ankle-brachial indices are 1.01 and 0.97  respectively. Waveform analysis indicates multiphasic waveforms in the  distal tibial arteries.       Impression    IMPRESSION: No evidence for significant lower extremity arterial  insufficiency.      BYRON Diagnostic Criteria      >/= 1.3          Non compressible  0.95-1.0          Normal  0.90-0.94        Mild PAD  0.50-0.89        Moderate PAD  0.20-0.49        Severe PAD  <0.20               Critical    DIEGO HINOJOSA MD       Allergies   Allergies   Allergen Reactions     Codeine      Fentanyl      Oxycodone      Penicillins      Pregabalin      Quetiapine

## 2019-03-18 NOTE — PLAN OF CARE
RN [id number]  Alert and oriented x4.  VSS.  Up with SBA..   C/o throat pain-  liquid dilaudid before meals and liquid lido/magic mouthwash/carafate with meals.  Tolerating diet. Wife at bedside, supportive.

## 2019-03-18 NOTE — PLAN OF CARE
"Pt is A/Ox4. Up with TONYA LACEY, walker. VSS on RA. C/o throat pain, managed with dilaudid suspension/lidocaine kartik/Carafate/magic mouth wash. Pt denies dizziness today/orthostatics negative. Pt states \"I feel great today, I could do marathon\". LS diminished. +BS/no bm today. Voiding. Eating well today. Discharged home with his wife at 3 pm.   "